# Patient Record
Sex: FEMALE | Race: WHITE | NOT HISPANIC OR LATINO | Employment: OTHER | URBAN - METROPOLITAN AREA
[De-identification: names, ages, dates, MRNs, and addresses within clinical notes are randomized per-mention and may not be internally consistent; named-entity substitution may affect disease eponyms.]

---

## 2017-03-27 DIAGNOSIS — E78.49 OTHER HYPERLIPIDEMIA: Primary | ICD-10-CM

## 2017-03-27 LAB
CHOLEST SERPL-MCNC: 224 MG/DL (ref 0–200)
CHOLEST/HDLC SERPL: 4.23 {RATIO}
HDLC SERPL-MCNC: 53 MG/DL (ref 40–60)
LDLC SERPL CALC-MCNC: 143 MG/DL (ref 0–100)
TRIGL SERPL-MCNC: 142 MG/DL (ref 0–150)
VLDLC SERPL CALC-MCNC: 28.4 MG/DL (ref 5–40)

## 2017-03-30 ENCOUNTER — OFFICE VISIT (OUTPATIENT)
Dept: INTERNAL MEDICINE | Facility: CLINIC | Age: 62
End: 2017-03-30

## 2017-03-30 VITALS
SYSTOLIC BLOOD PRESSURE: 152 MMHG | HEART RATE: 67 BPM | DIASTOLIC BLOOD PRESSURE: 82 MMHG | OXYGEN SATURATION: 93 % | BODY MASS INDEX: 27.88 KG/M2 | WEIGHT: 142 LBS | HEIGHT: 60 IN

## 2017-03-30 DIAGNOSIS — I10 ESSENTIAL HYPERTENSION: Primary | ICD-10-CM

## 2017-03-30 DIAGNOSIS — E78.5 HYPERLIPIDEMIA, UNSPECIFIED HYPERLIPIDEMIA TYPE: ICD-10-CM

## 2017-03-30 DIAGNOSIS — G93.31 POSTVIRAL FATIGUE SYNDROME: ICD-10-CM

## 2017-03-30 PROCEDURE — 99214 OFFICE O/P EST MOD 30 MIN: CPT | Performed by: NURSE PRACTITIONER

## 2017-03-30 RX ORDER — LISINOPRIL 10 MG/1
10 TABLET ORAL DAILY
Qty: 30 TABLET | Refills: 2 | Status: SHIPPED | OUTPATIENT
Start: 2017-03-30 | End: 2017-04-14 | Stop reason: DRUGHIGH

## 2017-03-30 RX ORDER — ROSUVASTATIN CALCIUM 5 MG/1
5 TABLET, COATED ORAL DAILY
Qty: 30 TABLET | Refills: 2 | Status: SHIPPED | OUTPATIENT
Start: 2017-03-30 | End: 2017-06-24 | Stop reason: SDUPTHER

## 2017-04-04 PROBLEM — G93.31 POSTVIRAL FATIGUE SYNDROME: Status: ACTIVE | Noted: 2017-04-04

## 2017-04-13 ENCOUNTER — TELEPHONE (OUTPATIENT)
Dept: INTERNAL MEDICINE | Facility: CLINIC | Age: 62
End: 2017-04-13

## 2017-04-13 NOTE — TELEPHONE ENCOUNTER
----- Message from Carina Rubalcava sent at 4/13/2017  9:16 AM EDT -----  Pt is on Lisinopril 10 mg. She is having cataract surgery on Monday. She said her BP has been running high. She wants a call back today but not between 10-12, she is at another appt.  Pt phone: [phone]    Ex: 165/98 and 160/90

## 2017-04-13 NOTE — TELEPHONE ENCOUNTER
Pt stated that her BP for the last 3 days has been running from 165//90. Pt is having cataract surgery on Monday & she is nervous that if they check her BP right before her surgery, they won't go forward with the surgery. Pt requesting to ask Irma what we need to do to get this down.

## 2017-04-13 NOTE — TELEPHONE ENCOUNTER
Pt aware to increase her lisinopril 10mg from QD to BID per Irma. Pt states that she will do this & update me on her BP readings. Pt was directed that if over the weekend she has bp readings of 170-180'S, to go to the ER.

## 2017-04-14 ENCOUNTER — TELEPHONE (OUTPATIENT)
Dept: INTERNAL MEDICINE | Facility: CLINIC | Age: 62
End: 2017-04-14

## 2017-04-14 RX ORDER — LISINOPRIL 20 MG/1
20 TABLET ORAL DAILY
Qty: 90 TABLET | Refills: 0 | Status: SHIPPED | OUTPATIENT
Start: 2017-04-14 | End: 2017-07-10 | Stop reason: SDUPTHER

## 2017-04-14 NOTE — TELEPHONE ENCOUNTER
----- Message from Carina Rubalcava sent at 4/14/2017  9:47 AM EDT -----  Pt called you back today about her BP readings?  Phone: [phone]

## 2017-04-14 NOTE — TELEPHONE ENCOUNTER
Pt stated that her BP is 140/80. Per Irma: pt to follow up in a month for increasing her BP med to 20mg. Pt was transferred to the front for scheduling.

## 2017-04-14 NOTE — TELEPHONE ENCOUNTER
Pt called & stated her BP readings have been 145/85. Pt stated that she will need a new RX for Lisinopril 20mg once daily sent to her pharmacy since her 10mg's are running low with her taking them BID. Pt aware that I'm sending those over now. Pt will call me around 4pm with a new BP reading.

## 2017-05-15 ENCOUNTER — OFFICE VISIT (OUTPATIENT)
Dept: INTERNAL MEDICINE | Facility: CLINIC | Age: 62
End: 2017-05-15

## 2017-05-15 VITALS
HEART RATE: 85 BPM | BODY MASS INDEX: 29.12 KG/M2 | HEIGHT: 60 IN | SYSTOLIC BLOOD PRESSURE: 118 MMHG | OXYGEN SATURATION: 99 % | DIASTOLIC BLOOD PRESSURE: 76 MMHG | WEIGHT: 148.31 LBS

## 2017-05-15 DIAGNOSIS — I10 ESSENTIAL HYPERTENSION: Primary | ICD-10-CM

## 2017-05-15 DIAGNOSIS — E78.5 HYPERLIPIDEMIA, UNSPECIFIED HYPERLIPIDEMIA TYPE: ICD-10-CM

## 2017-05-15 PROCEDURE — 99213 OFFICE O/P EST LOW 20 MIN: CPT | Performed by: NURSE PRACTITIONER

## 2017-06-24 DIAGNOSIS — E78.5 HYPERLIPIDEMIA, UNSPECIFIED HYPERLIPIDEMIA TYPE: ICD-10-CM

## 2017-06-26 RX ORDER — ROSUVASTATIN CALCIUM 5 MG/1
TABLET, COATED ORAL
Qty: 30 TABLET | Refills: 0 | Status: SHIPPED | OUTPATIENT
Start: 2017-06-26 | End: 2017-07-23 | Stop reason: SDUPTHER

## 2017-06-29 LAB
ALBUMIN SERPL-MCNC: 4.2 G/DL (ref 3.5–5.2)
ALBUMIN/GLOB SERPL: 1.4 G/DL
ALP SERPL-CCNC: 117 U/L (ref 39–117)
ALT SERPL-CCNC: 27 U/L (ref 1–33)
AST SERPL-CCNC: 27 U/L (ref 1–32)
BASOPHILS # BLD AUTO: 0.03 10*3/MM3 (ref 0–0.2)
BASOPHILS NFR BLD AUTO: 0.3 % (ref 0–1.5)
BILIRUB SERPL-MCNC: 0.3 MG/DL (ref 0.1–1.2)
BUN SERPL-MCNC: 15 MG/DL (ref 8–23)
BUN/CREAT SERPL: 26.3 (ref 7–25)
CALCIUM SERPL-MCNC: 10.2 MG/DL (ref 8.6–10.5)
CHLORIDE SERPL-SCNC: 99 MMOL/L (ref 98–107)
CHOLEST SERPL-MCNC: 174 MG/DL (ref 0–200)
CHOLEST/HDLC SERPL: 3.16 {RATIO}
CO2 SERPL-SCNC: 33.3 MMOL/L (ref 22–29)
CREAT SERPL-MCNC: 0.57 MG/DL (ref 0.57–1)
EOSINOPHIL # BLD AUTO: 0.29 10*3/MM3 (ref 0–0.7)
EOSINOPHIL NFR BLD AUTO: 3.3 % (ref 0.3–6.2)
ERYTHROCYTE [DISTWIDTH] IN BLOOD BY AUTOMATED COUNT: 14.6 % (ref 11.7–13)
GLOBULIN SER CALC-MCNC: 3.1 GM/DL
GLUCOSE SERPL-MCNC: 129 MG/DL (ref 65–99)
HCT VFR BLD AUTO: 49.1 % (ref 35.6–45.5)
HDLC SERPL-MCNC: 55 MG/DL (ref 40–60)
HGB BLD-MCNC: 15.8 G/DL (ref 11.9–15.5)
IMM GRANULOCYTES # BLD: 0 10*3/MM3 (ref 0–0.03)
IMM GRANULOCYTES NFR BLD: 0 % (ref 0–0.5)
LDLC SERPL CALC-MCNC: 56 MG/DL (ref 0–100)
LYMPHOCYTES # BLD AUTO: 2.12 10*3/MM3 (ref 0.9–4.8)
LYMPHOCYTES NFR BLD AUTO: 24 % (ref 19.6–45.3)
MAGNESIUM SERPL-MCNC: 2.3 MG/DL (ref 1.6–2.4)
MCH RBC QN AUTO: 32 PG (ref 26.9–32)
MCHC RBC AUTO-ENTMCNC: 32.2 G/DL (ref 32.4–36.3)
MCV RBC AUTO: 99.6 FL (ref 80.5–98.2)
MONOCYTES # BLD AUTO: 0.72 10*3/MM3 (ref 0.2–1.2)
MONOCYTES NFR BLD AUTO: 8.2 % (ref 5–12)
NEUTROPHILS # BLD AUTO: 5.66 10*3/MM3 (ref 1.9–8.1)
NEUTROPHILS NFR BLD AUTO: 64.2 % (ref 42.7–76)
PLATELET # BLD AUTO: 261 10*3/MM3 (ref 140–500)
POTASSIUM SERPL-SCNC: 4.8 MMOL/L (ref 3.5–5.2)
PROT SERPL-MCNC: 7.3 G/DL (ref 6–8.5)
RBC # BLD AUTO: 4.93 10*6/MM3 (ref 3.9–5.2)
SODIUM SERPL-SCNC: 144 MMOL/L (ref 136–145)
TRIGL SERPL-MCNC: 317 MG/DL (ref 0–150)
VLDLC SERPL CALC-MCNC: 63.4 MG/DL (ref 5–40)
WBC # BLD AUTO: 8.82 10*3/MM3 (ref 4.5–10.7)

## 2017-06-30 ENCOUNTER — RESULTS ENCOUNTER (OUTPATIENT)
Dept: INTERNAL MEDICINE | Facility: CLINIC | Age: 62
End: 2017-06-30

## 2017-06-30 DIAGNOSIS — E78.5 HYPERLIPIDEMIA, UNSPECIFIED HYPERLIPIDEMIA TYPE: ICD-10-CM

## 2017-06-30 DIAGNOSIS — I10 ESSENTIAL HYPERTENSION: ICD-10-CM

## 2017-06-30 LAB
HBA1C MFR BLD: 7 % (ref 4.8–5.6)
Lab: NORMAL
WRITTEN AUTHORIZATION: NORMAL

## 2017-07-06 ENCOUNTER — TELEPHONE (OUTPATIENT)
Dept: INTERNAL MEDICINE | Facility: CLINIC | Age: 62
End: 2017-07-06

## 2017-07-06 NOTE — TELEPHONE ENCOUNTER
----- Message from RUSSELL Clemente sent at 7/6/2017 12:38 PM EDT -----  Please let patient know that her blood sugar shows that she is diabetic. Please have her schedule a follow up appointment with me to discuss options

## 2017-07-10 ENCOUNTER — OFFICE VISIT (OUTPATIENT)
Dept: INTERNAL MEDICINE | Facility: CLINIC | Age: 62
End: 2017-07-10

## 2017-07-10 VITALS
WEIGHT: 151.25 LBS | HEART RATE: 91 BPM | DIASTOLIC BLOOD PRESSURE: 82 MMHG | SYSTOLIC BLOOD PRESSURE: 132 MMHG | OXYGEN SATURATION: 87 % | BODY MASS INDEX: 29.7 KG/M2 | HEIGHT: 60 IN

## 2017-07-10 DIAGNOSIS — E11.9 TYPE 2 DIABETES MELLITUS WITHOUT COMPLICATION, WITHOUT LONG-TERM CURRENT USE OF INSULIN (HCC): ICD-10-CM

## 2017-07-10 DIAGNOSIS — J44.9 CHRONIC OBSTRUCTIVE PULMONARY DISEASE, UNSPECIFIED COPD TYPE (HCC): Primary | ICD-10-CM

## 2017-07-10 PROCEDURE — 99214 OFFICE O/P EST MOD 30 MIN: CPT | Performed by: NURSE PRACTITIONER

## 2017-07-10 RX ORDER — LISINOPRIL 20 MG/1
TABLET ORAL
Qty: 90 TABLET | Refills: 1 | Status: SHIPPED | OUTPATIENT
Start: 2017-07-10 | End: 2018-01-23 | Stop reason: SDUPTHER

## 2017-07-10 RX ORDER — ALBUTEROL SULFATE 90 UG/1
2 AEROSOL, METERED RESPIRATORY (INHALATION) EVERY 4 HOURS PRN
Qty: 18 G | Refills: 3 | Status: SHIPPED | OUTPATIENT
Start: 2017-07-10

## 2017-07-10 NOTE — PROGRESS NOTES
Subjective   Laine Thomas is a 62 y.o. female. Patient is here today for   Chief Complaint   Patient presents with   • Diabetes     Pt here to follow up on lab results.    .    History of Present Illness   Patient is here to follow up on labs.     C/o increased shortness of breath with the high humidity the past couple of weeks. She states that she noticed a change with her breathing since she had flu a few months ago. She is   going to [place] in the next couple of weeks to visit her daughter. She has seen Dr. Maude Osuna in the past and will call her to make a follow up appointment. She is requesting an inhaler in the meantime.  She has used Advair in the past and does not feel like it helps very much.  She has not really used it on a regular basis due to the cost.     The following portions of the patient's history were reviewed and updated as appropriate: allergies, current medications, past family history, past medical history, past social history, past surgical history and problem list.    Review of Systems   Constitutional: Negative.    Respiratory: Positive for shortness of breath. Negative for cough and wheezing.    Cardiovascular: Negative.    Gastrointestinal: Negative.    Endocrine: Negative.    Genitourinary: Negative.        Objective     Vitals:    07/10/17 1000   BP: 132/82   Pulse: 91   SpO2: (!) 87%       Results Encounter on 06/30/2017   Component Date Value Ref Range Status   • WBC 06/29/2017 8.82  4.50 - 10.70 10*3/mm3 Final   • RBC 06/29/2017 4.93  3.90 - 5.20 10*6/mm3 Final   • Hemoglobin 06/29/2017 15.8* 11.9 - 15.5 g/dL Final   • Hematocrit 06/29/2017 49.1* 35.6 - 45.5 % Final   • MCV 06/29/2017 99.6* 80.5 - 98.2 fL Final   • MCH 06/29/2017 32.0  26.9 - 32.0 pg Final   • MCHC 06/29/2017 32.2* 32.4 - 36.3 g/dL Final   • RDW 06/29/2017 14.6* 11.7 - 13.0 % Final   • Platelets 06/29/2017 261  140 - 500 10*3/mm3 Final   • Neutrophil Rel % 06/29/2017 64.2  42.7 - 76.0 % Final   • Lymphocyte Rel  % 06/29/2017 24.0  19.6 - 45.3 % Final   • Monocyte Rel % 06/29/2017 8.2  5.0 - 12.0 % Final   • Eosinophil Rel % 06/29/2017 3.3  0.3 - 6.2 % Final   • Basophil Rel % 06/29/2017 0.3  0.0 - 1.5 % Final   • Neutrophils Absolute 06/29/2017 5.66  1.90 - 8.10 10*3/mm3 Final   • Lymphocytes Absolute 06/29/2017 2.12  0.90 - 4.80 10*3/mm3 Final   • Monocytes Absolute 06/29/2017 0.72  0.20 - 1.20 10*3/mm3 Final   • Eosinophils Absolute 06/29/2017 0.29  0.00 - 0.70 10*3/mm3 Final   • Basophils Absolute 06/29/2017 0.03  0.00 - 0.20 10*3/mm3 Final   • Immature Granulocyte Rel % 06/29/2017 0.0  0.0 - 0.5 % Final   • Immature Grans Absolute 06/29/2017 0.00  0.00 - 0.03 10*3/mm3 Final   • Glucose 06/29/2017 129* 65 - 99 mg/dL Final   • BUN 06/29/2017 15  8 - 23 mg/dL Final   • Creatinine 06/29/2017 0.57  0.57 - 1.00 mg/dL Final   • eGFR Non African Am 06/29/2017 107  >60 mL/min/1.73 Final   • eGFR African Am 06/29/2017 130  >60 mL/min/1.73 Final   • BUN/Creatinine Ratio 06/29/2017 26.3* 7.0 - 25.0 Final   • Sodium 06/29/2017 144  136 - 145 mmol/L Final   • Potassium 06/29/2017 4.8  3.5 - 5.2 mmol/L Final   • Chloride 06/29/2017 99  98 - 107 mmol/L Final   • Total CO2 06/29/2017 33.3* 22.0 - 29.0 mmol/L Final   • Calcium 06/29/2017 10.2  8.6 - 10.5 mg/dL Final   • Total Protein 06/29/2017 7.3  6.0 - 8.5 g/dL Final   • Albumin 06/29/2017 4.20  3.50 - 5.20 g/dL Final   • Globulin 06/29/2017 3.1  gm/dL Final   • A/G Ratio 06/29/2017 1.4  g/dL Final   • Total Bilirubin 06/29/2017 0.3  0.1 - 1.2 mg/dL Final   • Alkaline Phosphatase 06/29/2017 117  39 - 117 U/L Final   • AST (SGOT) 06/29/2017 27  1 - 32 U/L Final   • ALT (SGPT) 06/29/2017 27  1 - 33 U/L Final   • Magnesium 06/29/2017 2.3  1.6 - 2.4 mg/dL Final   • Total Cholesterol 06/29/2017 174  0 - 200 mg/dL Final   • Triglycerides 06/29/2017 317* 0 - 150 mg/dL Final   • HDL Cholesterol 06/29/2017 55  40 - 60 mg/dL Final   • VLDL Cholesterol 06/29/2017 63.4* 5 - 40 mg/dL Final   • LDL  Cholesterol  06/29/2017 56  0 - 100 mg/dL Final   • Chol/HDL Ratio 06/29/2017 3.16   Final   • Hemoglobin A1C 06/29/2017 7.00* 4.80 - 5.60 % Final    Comment: Hemoglobin A1C Ranges:  Increased Risk for Diabetes  5.7% to 6.4%  Diabetes                     >= 6.5%  Diabetic Goal                < 7.0%     • Please note 06/29/2017 Comment   Final    Comment: We have received your request for additional testing or test  verification.  You will be notified if we are unable to process  your request.     • Written Authorization 06/29/2017 Comment   Final    Comment: Written Authorization Received.  Authorization received from WRITTEN REQUEST 06-  Logged by Khloe Macario       Labs were reviewed with patient. She has been diagnosed with Type 2 diabetes.     Physical Exam   Constitutional: Vital signs are normal. She appears well-developed and well-nourished.   Cardiovascular: Normal rate, regular rhythm and normal heart sounds.    Pulmonary/Chest: Effort normal. No respiratory distress. She has decreased breath sounds in the right lower field and the left lower field.   Neurological: She is alert.   Skin: Skin is warm, dry and intact.   Psychiatric: She has a normal mood and affect. Her speech is normal and behavior is normal. Thought content normal.   Nursing note and vitals reviewed.      Assessment/Plan   Laine was seen today for diabetes.    Diagnoses and all orders for this visit:    Chronic obstructive pulmonary disease, unspecified COPD type  -     albuterol (PROVENTIL HFA;VENTOLIN HFA) 108 (90 BASE) MCG/ACT inhaler; Inhale 2 puffs Every 4 (Four) Hours As Needed for Wheezing.    Type 2 diabetes mellitus without complication, without long-term current use of insulin  -     Comprehensive Metabolic Panel; Future  -     Hemoglobin A1c; Future    Patient would like to try to improve her blood sugar with diet and exercise.  She will follow up in 3 months with labs and appointment.  Her  is also diabetic.   She is not wanting to go to diabetic education at this time.        COPD - she was given samples of both Anoro and Symbicort. She will try one for 2 weeks and then try the other. She was given enough samples for a few weeks since she will be traveling next week. She will call and make an appointment with pulmonary.

## 2017-07-12 ENCOUNTER — TELEPHONE (OUTPATIENT)
Dept: INTERNAL MEDICINE | Facility: CLINIC | Age: 62
End: 2017-07-12

## 2017-07-12 DIAGNOSIS — J44.9 CHRONIC OBSTRUCTIVE PULMONARY DISEASE, UNSPECIFIED COPD TYPE (HCC): Primary | ICD-10-CM

## 2017-07-12 NOTE — TELEPHONE ENCOUNTER
----- Message from Carina Rubalcava sent at 7/12/2017  9:59 AM EDT -----  Pt called eliing Irma's assistant to call her back. All that she would say is that is concerning a referral. Would not elaborate.  [facility]: [phone]

## 2017-07-12 NOTE — TELEPHONE ENCOUNTER
Pt called & stated that she saw Irma for COPD on Monday. Her insurance does not require a referral for specialist, but the pulmonary office that she goes to, does require one. This has been okay'd be Irma.

## 2017-07-23 DIAGNOSIS — E78.5 HYPERLIPIDEMIA, UNSPECIFIED HYPERLIPIDEMIA TYPE: ICD-10-CM

## 2017-07-24 RX ORDER — ROSUVASTATIN CALCIUM 5 MG/1
TABLET, COATED ORAL
Qty: 30 TABLET | Refills: 5 | Status: SHIPPED | OUTPATIENT
Start: 2017-07-24 | End: 2018-01-23 | Stop reason: SDUPTHER

## 2017-10-06 DIAGNOSIS — E11.9 TYPE 2 DIABETES MELLITUS WITHOUT COMPLICATION, WITHOUT LONG-TERM CURRENT USE OF INSULIN (HCC): ICD-10-CM

## 2017-10-17 ENCOUNTER — OFFICE VISIT (OUTPATIENT)
Dept: INTERNAL MEDICINE | Facility: CLINIC | Age: 62
End: 2017-10-17

## 2017-10-17 VITALS
HEART RATE: 77 BPM | OXYGEN SATURATION: 97 % | SYSTOLIC BLOOD PRESSURE: 120 MMHG | DIASTOLIC BLOOD PRESSURE: 60 MMHG | HEIGHT: 60 IN | BODY MASS INDEX: 31.72 KG/M2 | WEIGHT: 161.56 LBS

## 2017-10-17 DIAGNOSIS — E11.9 TYPE 2 DIABETES MELLITUS WITHOUT COMPLICATION, WITHOUT LONG-TERM CURRENT USE OF INSULIN (HCC): ICD-10-CM

## 2017-10-17 DIAGNOSIS — I48.91 ATRIAL FIBRILLATION, UNSPECIFIED TYPE (HCC): ICD-10-CM

## 2017-10-17 DIAGNOSIS — J44.9 CHRONIC OBSTRUCTIVE PULMONARY DISEASE, UNSPECIFIED COPD TYPE (HCC): Primary | ICD-10-CM

## 2017-10-17 PROCEDURE — 99214 OFFICE O/P EST MOD 30 MIN: CPT | Performed by: NURSE PRACTITIONER

## 2017-10-17 RX ORDER — DILTIAZEM HYDROCHLORIDE 120 MG/1
2 CAPSULE, EXTENDED RELEASE ORAL DAILY
COMMUNITY
Start: 2017-10-10 | End: 2018-09-11

## 2017-10-17 RX ORDER — RIVAROXABAN 20 MG/1
1 TABLET, FILM COATED ORAL DAILY
COMMUNITY
Start: 2017-10-12 | End: 2018-09-11

## 2017-10-17 RX ORDER — GLYCOPYRROLATE AND FORMOTEROL FUMARATE 9; 4.8 UG/1; UG/1
2 AEROSOL, METERED RESPIRATORY (INHALATION) 2 TIMES DAILY
COMMUNITY
Start: 2017-09-19 | End: 2020-06-05

## 2017-10-17 NOTE — PROGRESS NOTES
Subjective   Laine Thomas is a 62 y.o. female. Patient is here today for   Chief Complaint   Patient presents with   • Diabetes     Pt here to follow up on dm. Pt did not have labs done prior to appt but she is fasting this morning.    • Atrial Fibrillation     Pt recently dx with A Fib & is scheduled to follow up with Cardiology next week. Pt is now taking Xarelto 20mg once daily & Cartia 120mg once daily.     .    History of Present Illness   Patient is here to f/u after an ER visit. She has seen pulmonology and was started on Bevespi. States that not a lot has changed with her breathing. She still has some shortness of breath with exertion. She had a sleep study and will be fitted with a CPAP soon. She will also need O2 at night. She is currently in pulmonary rehab and was found to be in atrial fib, so was sent to ER. There she was started on xarelto and diltiazem. She has a f/u appointment with cardiologist next week. States that she could not tell that she was in afib (didn't have palpitations). She is also waiting for an event monitor to wear. While she was in the hospital, she had fasting labs done.     Patient is also here to follow up on diabetes. Her last HgbA1C was 7.00 and she did not want to go on medication. She was going to change her diet. She admits that she hasn't been able to do that due to her other health issues, but states that she is ready to make changes.      The following portions of the patient's history were reviewed and updated as appropriate: allergies, current medications, past family history, past medical history, past social history, past surgical history and problem list.    Review of Systems   Constitutional: Negative.    HENT: Negative.    Respiratory: Positive for shortness of breath. Negative for cough and wheezing.    Cardiovascular: Negative.    Gastrointestinal: Negative.    Endocrine: Negative.    Genitourinary: Negative.        Objective   Vitals:    10/17/17 0807   BP:  120/60   Pulse: 77   SpO2: 97%     Reviewed labs that patient had drawn at [facility], including CMP, lipids, troponin, and TSH. These will be scanned into the chart.     Physical Exam   Constitutional: Vital signs are normal. She appears well-developed and well-nourished.   HENT:   Right Ear: Tympanic membrane and ear canal normal.   Left Ear: Tympanic membrane and ear canal normal.   Mouth/Throat: Oropharynx is clear and moist and mucous membranes are normal.   Cardiovascular: Normal rate, regular rhythm and normal heart sounds.    Heart rhythm is regular at this time   Pulmonary/Chest: Effort normal and breath sounds normal.   Lymphadenopathy:     She has no cervical adenopathy.   Skin: Skin is warm, dry and intact.   Psychiatric: She has a normal mood and affect. Her speech is normal and behavior is normal. Thought content normal.   Nursing note and vitals reviewed.      Assessment/Plan   Laine was seen today for diabetes and atrial fibrillation.    Diagnoses and all orders for this visit:    Chronic obstructive pulmonary disease, unspecified COPD type    Type 2 diabetes mellitus without complication, without long-term current use of insulin    Atrial fibrillation, unspecified type      COPD - followed by pulmonology    DM - Patient is not wanting a HgbA1C checked today due to finances/high deductible plan. She will return after the first of the year for a complete physical and fasting labs. She is going to change her eating habits to improve her glucose.     Atrial fib - follow up with cardiology next week

## 2017-10-18 PROBLEM — I48.91 ATRIAL FIBRILLATION (HCC): Status: ACTIVE | Noted: 2017-10-18

## 2017-12-06 ENCOUNTER — AMBULATORY SURGICAL CENTER (OUTPATIENT)
Dept: URBAN - METROPOLITAN AREA SURGERY 17 | Facility: SURGERY | Age: 62
End: 2017-12-06

## 2017-12-06 VITALS
SYSTOLIC BLOOD PRESSURE: 112 MMHG | HEART RATE: 87 BPM | SYSTOLIC BLOOD PRESSURE: 140 MMHG | OXYGEN SATURATION: 92 % | OXYGEN SATURATION: 81 % | HEART RATE: 85 BPM | SYSTOLIC BLOOD PRESSURE: 135 MMHG | OXYGEN SATURATION: 93 % | RESPIRATION RATE: 14 BRPM | HEART RATE: 73 BPM | HEART RATE: 102 BPM | DIASTOLIC BLOOD PRESSURE: 65 MMHG | SYSTOLIC BLOOD PRESSURE: 139 MMHG | RESPIRATION RATE: 19 BRPM | WEIGHT: 165 LBS | DIASTOLIC BLOOD PRESSURE: 77 MMHG | OXYGEN SATURATION: 87 % | OXYGEN SATURATION: 91 % | SYSTOLIC BLOOD PRESSURE: 113 MMHG | HEART RATE: 92 BPM | SYSTOLIC BLOOD PRESSURE: 126 MMHG | HEART RATE: 83 BPM | RESPIRATION RATE: 35 BRPM | HEART RATE: 78 BPM | SYSTOLIC BLOOD PRESSURE: 111 MMHG | HEART RATE: 81 BPM | TEMPERATURE: 99.2 F | DIASTOLIC BLOOD PRESSURE: 61 MMHG | OXYGEN SATURATION: 94 % | DIASTOLIC BLOOD PRESSURE: 63 MMHG | SYSTOLIC BLOOD PRESSURE: 128 MMHG | DIASTOLIC BLOOD PRESSURE: 82 MMHG | RESPIRATION RATE: 16 BRPM | DIASTOLIC BLOOD PRESSURE: 73 MMHG | OXYGEN SATURATION: 96 % | DIASTOLIC BLOOD PRESSURE: 68 MMHG | TEMPERATURE: 98.9 F | HEART RATE: 99 BPM | RESPIRATION RATE: 17 BRPM | RESPIRATION RATE: 12 BRPM | HEART RATE: 74 BPM | DIASTOLIC BLOOD PRESSURE: 60 MMHG | DIASTOLIC BLOOD PRESSURE: 67 MMHG | HEART RATE: 67 BPM

## 2017-12-06 DIAGNOSIS — Z86.010 PERSONAL HISTORY OF COLONIC POLYPS: ICD-10-CM

## 2017-12-06 DIAGNOSIS — K57.30 DIVERTICULOSIS OF LARGE INTESTINE WITHOUT PERFORATION OR ABS: ICD-10-CM

## 2017-12-06 DIAGNOSIS — K64.9 UNSPECIFIED HEMORRHOIDS: ICD-10-CM

## 2017-12-06 PROCEDURE — 45378 DIAGNOSTIC COLONOSCOPY: CPT | Mod: 33 | Performed by: INTERNAL MEDICINE

## 2017-12-06 RX ADMIN — PROPOFOL 25 MG: 10 INJECTION, EMULSION INTRAVENOUS at 13:38

## 2017-12-06 RX ADMIN — PROPOFOL 25 MG: 10 INJECTION, EMULSION INTRAVENOUS at 13:44

## 2017-12-06 RX ADMIN — PROPOFOL 50 MG: 10 INJECTION, EMULSION INTRAVENOUS at 13:34

## 2017-12-06 RX ADMIN — LIDOCAINE HYDROCHLORIDE 50 MG: 10 INJECTION, SOLUTION EPIDURAL; INFILTRATION; INTRACAUDAL; PERINEURAL at 13:29

## 2017-12-06 RX ADMIN — PROPOFOL 100 MG: 10 INJECTION, EMULSION INTRAVENOUS at 13:29

## 2017-12-07 PROBLEM — K57.30 DIVERTICULOSIS OF LARGE INTESTINE WITHOUT HEMORRHAGE: Status: ACTIVE | Noted: 2017-12-07

## 2018-01-23 DIAGNOSIS — E78.5 HYPERLIPIDEMIA, UNSPECIFIED HYPERLIPIDEMIA TYPE: ICD-10-CM

## 2018-01-23 RX ORDER — ROSUVASTATIN CALCIUM 5 MG/1
TABLET, COATED ORAL
Qty: 30 TABLET | Refills: 5 | Status: SHIPPED | OUTPATIENT
Start: 2018-01-23 | End: 2018-07-31 | Stop reason: SDUPTHER

## 2018-01-23 RX ORDER — LISINOPRIL 20 MG/1
TABLET ORAL
Qty: 30 TABLET | Refills: 5 | Status: SHIPPED | OUTPATIENT
Start: 2018-01-23 | End: 2018-02-13

## 2018-02-08 DIAGNOSIS — R73.09 ELEVATED HEMOGLOBIN A1C: ICD-10-CM

## 2018-02-08 DIAGNOSIS — Z00.00 HEALTH CARE MAINTENANCE: Primary | ICD-10-CM

## 2018-02-08 DIAGNOSIS — E78.49 OTHER HYPERLIPIDEMIA: ICD-10-CM

## 2018-02-09 LAB
ALBUMIN SERPL-MCNC: 3.7 G/DL (ref 3.5–5.2)
ALBUMIN/GLOB SERPL: 1.2 G/DL
ALP SERPL-CCNC: 111 U/L (ref 39–117)
ALT SERPL-CCNC: 28 U/L (ref 1–33)
AST SERPL-CCNC: 19 U/L (ref 1–32)
BASOPHILS # BLD AUTO: 0.03 10*3/MM3 (ref 0–0.2)
BASOPHILS NFR BLD AUTO: 0.3 % (ref 0–1.5)
BILIRUB SERPL-MCNC: 0.5 MG/DL (ref 0.1–1.2)
BUN SERPL-MCNC: 15 MG/DL (ref 8–23)
BUN/CREAT SERPL: 19.2 (ref 7–25)
CALCIUM SERPL-MCNC: 9.5 MG/DL (ref 8.6–10.5)
CHLORIDE SERPL-SCNC: 98 MMOL/L (ref 98–107)
CHOLEST SERPL-MCNC: 158 MG/DL (ref 0–200)
CHOLEST/HDLC SERPL: 3.29 {RATIO}
CO2 SERPL-SCNC: 28.5 MMOL/L (ref 22–29)
CREAT SERPL-MCNC: 0.78 MG/DL (ref 0.57–1)
EOSINOPHIL # BLD AUTO: 0.2 10*3/MM3 (ref 0–0.7)
EOSINOPHIL NFR BLD AUTO: 1.9 % (ref 0.3–6.2)
ERYTHROCYTE [DISTWIDTH] IN BLOOD BY AUTOMATED COUNT: 12.7 % (ref 11.7–13)
GFR SERPLBLD CREATININE-BSD FMLA CKD-EPI: 75 ML/MIN/1.73
GFR SERPLBLD CREATININE-BSD FMLA CKD-EPI: 90 ML/MIN/1.73
GLOBULIN SER CALC-MCNC: 3.1 GM/DL
GLUCOSE SERPL-MCNC: 196 MG/DL (ref 65–99)
HBA1C MFR BLD: 8.83 % (ref 4.8–5.6)
HCT VFR BLD AUTO: 46 % (ref 35.6–45.5)
HDLC SERPL-MCNC: 48 MG/DL (ref 40–60)
HGB BLD-MCNC: 14.8 G/DL (ref 11.9–15.5)
IMM GRANULOCYTES # BLD: 0.02 10*3/MM3 (ref 0–0.03)
IMM GRANULOCYTES NFR BLD: 0.2 % (ref 0–0.5)
LDLC SERPL CALC-MCNC: 81 MG/DL (ref 0–100)
LYMPHOCYTES # BLD AUTO: 2.04 10*3/MM3 (ref 0.9–4.8)
LYMPHOCYTES NFR BLD AUTO: 19.4 % (ref 19.6–45.3)
MCH RBC QN AUTO: 31.7 PG (ref 26.9–32)
MCHC RBC AUTO-ENTMCNC: 32.2 G/DL (ref 32.4–36.3)
MCV RBC AUTO: 98.5 FL (ref 80.5–98.2)
MONOCYTES # BLD AUTO: 0.69 10*3/MM3 (ref 0.2–1.2)
MONOCYTES NFR BLD AUTO: 6.6 % (ref 5–12)
NEUTROPHILS # BLD AUTO: 7.55 10*3/MM3 (ref 1.9–8.1)
NEUTROPHILS NFR BLD AUTO: 71.6 % (ref 42.7–76)
PLATELET # BLD AUTO: 293 10*3/MM3 (ref 140–500)
POTASSIUM SERPL-SCNC: 4.5 MMOL/L (ref 3.5–5.2)
PROT SERPL-MCNC: 6.8 G/DL (ref 6–8.5)
RBC # BLD AUTO: 4.67 10*6/MM3 (ref 3.9–5.2)
SODIUM SERPL-SCNC: 140 MMOL/L (ref 136–145)
TRIGL SERPL-MCNC: 145 MG/DL (ref 0–150)
VLDLC SERPL CALC-MCNC: 29 MG/DL (ref 5–40)
WBC # BLD AUTO: 10.53 10*3/MM3 (ref 4.5–10.7)

## 2018-02-13 ENCOUNTER — OFFICE VISIT (OUTPATIENT)
Dept: INTERNAL MEDICINE | Facility: CLINIC | Age: 63
End: 2018-02-13

## 2018-02-13 VITALS
SYSTOLIC BLOOD PRESSURE: 106 MMHG | BODY MASS INDEX: 32.85 KG/M2 | DIASTOLIC BLOOD PRESSURE: 60 MMHG | HEIGHT: 60 IN | OXYGEN SATURATION: 97 % | HEART RATE: 74 BPM | WEIGHT: 167.31 LBS

## 2018-02-13 DIAGNOSIS — I10 ESSENTIAL HYPERTENSION: ICD-10-CM

## 2018-02-13 DIAGNOSIS — E11.9 TYPE 2 DIABETES MELLITUS WITHOUT COMPLICATION, WITHOUT LONG-TERM CURRENT USE OF INSULIN (HCC): Primary | ICD-10-CM

## 2018-02-13 PROCEDURE — 99396 PREV VISIT EST AGE 40-64: CPT | Performed by: NURSE PRACTITIONER

## 2018-02-13 RX ORDER — BLOOD-GLUCOSE METER
1 KIT MISCELLANEOUS DAILY
Qty: 1 EACH | Refills: 0 | Status: SHIPPED | OUTPATIENT
Start: 2018-02-13

## 2018-02-13 RX ORDER — LISINOPRIL 5 MG/1
1 TABLET ORAL DAILY
COMMUNITY
Start: 2018-02-11 | End: 2018-03-01

## 2018-02-13 NOTE — PROGRESS NOTES
"Subjective   Laine Thomas is a 62 y.o. female and is here for a comprehensive physical exam. The patient reports no problems.    She was without her Cartia, so hasn't been able to exercise because she was afraid that her heart rate would get too high. She has gained weight since she wasn't able to exercise. She now is taking her Cartia and [facility] and will be starting pulmonary rehab to start at the end of the month.     Do you take any herbs or supplements that were not prescribed by a doctor? no  Are you taking aspirin daily? no     History:  Patient sees gynecology and is up to date with pap and mammogram    The following portions of the patient's history were reviewed and updated as appropriate: allergies, current medications, past family history, past medical history, past social history, past surgical history and problem list.    Review of Systems  Do you have pain that bothers you in your daily life? no  A comprehensive review of systems was negative.    Objective   /60 (BP Location: Left arm, Patient Position: Sitting, Cuff Size: Adult)  Pulse 74  Ht 152.4 cm (60\")  Wt 75.9 kg (167 lb 5 oz)  SpO2 97%  BMI 32.68 kg/m2    General Appearance:    Alert, cooperative, no distress, appears stated age   Head:    Normocephalic, without obvious abnormality, atraumatic   Eyes:    PERRL, conjunctiva/corneas clear, EOM's intact, fundi     benign, both eyes   Ears:    Normal TM's and external ear canals, both ears   Nose:   Nares normal, septum midline, mucosa normal, no drainage    or sinus tenderness   Throat:   Lips, mucosa, and tongue normal; teeth and gums normal   Neck:   Supple, symmetrical, trachea midline, no adenopathy;     thyroid:  no enlargement/tenderness/nodules; no carotid    bruit or JVD   Back:     Symmetric, no curvature, ROM normal, no CVA tenderness   Lungs:     Clear to auscultation bilaterally, respirations unlabored   Chest Wall:    No tenderness or deformity    Heart:    " Regular rate and rhythm, S1 and S2 normal, no murmur, rub   or gallop       Abdomen:     Soft, non-tender, bowel sounds active all four quadrants,     no masses, no organomegaly           Extremities:   Extremities normal, atraumatic, no cyanosis or edema   Pulses:   2+ and symmetric all extremities   Skin:   Skin color, texture, turgor normal, no rashes or lesions   Lymph nodes:   Cervical, supraclavicular, and axillary nodes normal   Neurologic:   CNII-XII intact, normal strength, sensation and reflexes     throughout     Orders Only on 02/08/2018   Component Date Value Ref Range Status   • WBC 02/08/2018 10.53  4.50 - 10.70 10*3/mm3 Final   • RBC 02/08/2018 4.67  3.90 - 5.20 10*6/mm3 Final   • Hemoglobin 02/08/2018 14.8  11.9 - 15.5 g/dL Final   • Hematocrit 02/08/2018 46.0* 35.6 - 45.5 % Final   • MCV 02/08/2018 98.5* 80.5 - 98.2 fL Final   • MCH 02/08/2018 31.7  26.9 - 32.0 pg Final   • MCHC 02/08/2018 32.2* 32.4 - 36.3 g/dL Final   • RDW 02/08/2018 12.7  11.7 - 13.0 % Final   • Platelets 02/08/2018 293  140 - 500 10*3/mm3 Final   • Neutrophil Rel % 02/08/2018 71.6  42.7 - 76.0 % Final   • Lymphocyte Rel % 02/08/2018 19.4* 19.6 - 45.3 % Final   • Monocyte Rel % 02/08/2018 6.6  5.0 - 12.0 % Final   • Eosinophil Rel % 02/08/2018 1.9  0.3 - 6.2 % Final   • Basophil Rel % 02/08/2018 0.3  0.0 - 1.5 % Final   • Neutrophils Absolute 02/08/2018 7.55  1.90 - 8.10 10*3/mm3 Final   • Lymphocytes Absolute 02/08/2018 2.04  0.90 - 4.80 10*3/mm3 Final   • Monocytes Absolute 02/08/2018 0.69  0.20 - 1.20 10*3/mm3 Final   • Eosinophils Absolute 02/08/2018 0.20  0.00 - 0.70 10*3/mm3 Final   • Basophils Absolute 02/08/2018 0.03  0.00 - 0.20 10*3/mm3 Final   • Immature Granulocyte Rel % 02/08/2018 0.2  0.0 - 0.5 % Final   • Immature Grans Absolute 02/08/2018 0.02  0.00 - 0.03 10*3/mm3 Final   • Glucose 02/08/2018 196* 65 - 99 mg/dL Final   • BUN 02/08/2018 15  8 - 23 mg/dL Final   • Creatinine 02/08/2018 0.78  0.57 - 1.00 mg/dL  Final   • eGFR Non  Am 02/08/2018 75  >60 mL/min/1.73 Final   • eGFR African Am 02/08/2018 90  >60 mL/min/1.73 Final   • BUN/Creatinine Ratio 02/08/2018 19.2  7.0 - 25.0 Final   • Sodium 02/08/2018 140  136 - 145 mmol/L Final   • Potassium 02/08/2018 4.5  3.5 - 5.2 mmol/L Final   • Chloride 02/08/2018 98  98 - 107 mmol/L Final   • Total CO2 02/08/2018 28.5  22.0 - 29.0 mmol/L Final   • Calcium 02/08/2018 9.5  8.6 - 10.5 mg/dL Final   • Total Protein 02/08/2018 6.8  6.0 - 8.5 g/dL Final   • Albumin 02/08/2018 3.70  3.50 - 5.20 g/dL Final   • Globulin 02/08/2018 3.1  gm/dL Final   • A/G Ratio 02/08/2018 1.2  g/dL Final   • Total Bilirubin 02/08/2018 0.5  0.1 - 1.2 mg/dL Final   • Alkaline Phosphatase 02/08/2018 111  39 - 117 U/L Final   • AST (SGOT) 02/08/2018 19  1 - 32 U/L Final   • ALT (SGPT) 02/08/2018 28  1 - 33 U/L Final   • Total Cholesterol 02/08/2018 158  0 - 200 mg/dL Final   • Triglycerides 02/08/2018 145  0 - 150 mg/dL Final   • HDL Cholesterol 02/08/2018 48  40 - 60 mg/dL Final   • VLDL Cholesterol 02/08/2018 29  5 - 40 mg/dL Final   • LDL Cholesterol  02/08/2018 81  0 - 100 mg/dL Final   • Chol/HDL Ratio 02/08/2018 3.29   Final   • Hemoglobin A1C 02/08/2018 8.83* 4.80 - 5.60 % Final    Comment: Hemoglobin A1C Ranges:  Increased Risk for Diabetes  5.7% to 6.4%  Diabetes                     >= 6.5%  Diabetic Goal                < 7.0%       Labs reviewed with patient.      Physical Exam    Laine had a diabetic foot exam performed today.   During the foot exam she had a monofilament test not performed.    Vascular Status -  Her exam exhibits right foot vasculature normal. Her exam exhibits no right foot edema. Her exam exhibits left foot vasculature normal. Her exam exhibits no left foot edema.   Skin Integrity  -  Her right foot skin is intact.     Laine 's left foot skin is intact. .      Assessment/Plan   Healthy female exam.     1. Laine was seen today for annual exam.    Diagnoses and all orders  for this visit:    Type 2 diabetes mellitus without complication, without long-term current use of insulin  -     Liraglutide (VICTOZA) 18 MG/3ML solution pen-injector injection; Inject 1.8 mg under the skin Daily.  -     Insulin Pen Needle (NOVOFINE PLUS) 32G X 4 MM misc; Use daily to inject victoza    Essential hypertension    Other orders  -     glucose monitor monitoring kit; 1 each Daily. Use to check glucose daily      DM - patient was given a sample of Victoza. She will also be exercising and improving her diet. She was unable to tolerate metformin in the past and really isn't wanting to take any medications. Patient will check her fasting glucose a few times a week and check a post-prandial glucose a few times a week.    2. Patient Counseling:  --Nutrition: Stressed importance of moderation in sodium/caffeine intake, saturated fat and cholesterol, caloric balance, sufficient intake of fresh fruits, vegetables, fiber, calcium, iron.  --Exercise: Stressed the importance of regular exercise.   --Injury prevention: Discussed safety belts, safety helmets, smoke detector.   --Dental health: Discussed importance of regular tooth brushing, flossing, and dental visits.  --Immunizations reviewed.  --Discussed benefits of screening colonoscopy. Done 12/2017    3. Discussed the patient's BMI with her.  The BMI is above average; BMI management plan is completed  4. Follow up in 2 months.

## 2018-02-14 LAB
HCV AB S/CO SERPL IA: <0.1 S/CO RATIO (ref 0–0.9)
Lab: NORMAL
WRITTEN AUTHORIZATION: NORMAL

## 2018-03-01 ENCOUNTER — OFFICE VISIT (OUTPATIENT)
Dept: INTERNAL MEDICINE | Facility: CLINIC | Age: 63
End: 2018-03-01

## 2018-03-01 VITALS
DIASTOLIC BLOOD PRESSURE: 64 MMHG | BODY MASS INDEX: 32.83 KG/M2 | HEIGHT: 60 IN | OXYGEN SATURATION: 90 % | SYSTOLIC BLOOD PRESSURE: 102 MMHG | HEART RATE: 84 BPM | WEIGHT: 167.25 LBS | TEMPERATURE: 99 F

## 2018-03-01 DIAGNOSIS — J06.9 ACUTE URI: Primary | ICD-10-CM

## 2018-03-01 LAB
EXPIRATION DATE: NORMAL
EXPIRATION DATE: NORMAL
FLUAV AG NPH QL: NEGATIVE
FLUBV AG NPH QL: NEGATIVE
INTERNAL CONTROL: NORMAL
INTERNAL CONTROL: NORMAL
Lab: NORMAL
Lab: NORMAL
S PYO AG THROAT QL: NEGATIVE

## 2018-03-01 PROCEDURE — 99213 OFFICE O/P EST LOW 20 MIN: CPT | Performed by: NURSE PRACTITIONER

## 2018-03-01 PROCEDURE — 87804 INFLUENZA ASSAY W/OPTIC: CPT | Performed by: NURSE PRACTITIONER

## 2018-03-01 PROCEDURE — 87880 STREP A ASSAY W/OPTIC: CPT | Performed by: NURSE PRACTITIONER

## 2018-03-01 RX ORDER — AMOXICILLIN 875 MG/1
875 TABLET, COATED ORAL 2 TIMES DAILY
Qty: 20 TABLET | Refills: 0 | Status: SHIPPED | OUTPATIENT
Start: 2018-03-01 | End: 2018-09-11

## 2018-03-01 NOTE — PROGRESS NOTES
Subjective   Laine Thomas is a 63 y.o. female. Patient is here today for   Chief Complaint   Patient presents with   • URI     Pt complains of having a productive cough, yellow sputum & ST, fatigue x5 days.     .    History of Present Illness   C/o sore throat associated with post-nasal drainage, headache, fatigue. Denies fever. She has had some runny nose. She has had some ibuprofen for her headache. Denies sick contacts. Her symptoms seem to be worse today.     The following portions of the patient's history were reviewed and updated as appropriate: allergies, current medications, past family history, past medical history, past social history, past surgical history and problem list.    Review of Systems   Constitutional: Positive for chills and fatigue.   HENT: Positive for congestion, postnasal drip and sore throat. Negative for sinus pain and sinus pressure.    Respiratory: Positive for cough.    Cardiovascular: Negative.        Objective   Vitals:    03/01/18 1446   BP: 102/64   Pulse: 84   Temp: 99 °F (37.2 °C)   SpO2: 90%     Results for orders placed or performed in visit on 03/01/18   POCT rapid strep A   Result Value Ref Range    Rapid Strep A Screen Negative Negative, VALID, INVALID, Not Performed    Internal Control Passed Passed    Lot Number [id number]     Expiration Date 07/31/2019    POCT Influenza A/B   Result Value Ref Range    Rapid Influenza A Ag negative     Rapid Influenza B Ag negative     Internal Control Passed Passed    Lot Number [id number]     Expiration Date 11/08/2020        Physical Exam   Constitutional: Vital signs are normal. She appears well-developed and well-nourished.   HENT:   Right Ear: Ear canal normal. A middle ear effusion is present.   Left Ear: Tympanic membrane and ear canal normal.   Mouth/Throat: Oropharynx is clear and moist and mucous membranes are normal.   Cardiovascular: Normal rate, regular rhythm and normal heart sounds.    Pulmonary/Chest: Effort normal and  breath sounds normal.   Lymphadenopathy:     She has no cervical adenopathy.   Skin: Skin is warm, dry and intact.   Psychiatric: She has a normal mood and affect. Her speech is normal and behavior is normal. Thought content normal.   Nursing note and vitals reviewed.      Assessment/Plan   Laine was seen today for uri.    Diagnoses and all orders for this visit:    Acute URI  -     amoxicillin (AMOXIL) 875 MG tablet; Take 1 tablet by mouth 2 (Two) Times a Day.  -     POCT rapid strep A  -     POCT Influenza A/B    Patient will also take Mucinex DM and drink plenty of water.

## 2018-03-19 ENCOUNTER — TELEPHONE (OUTPATIENT)
Dept: INTERNAL MEDICINE | Facility: CLINIC | Age: 63
End: 2018-03-19

## 2018-03-19 RX ORDER — SITAGLIPTIN 100 MG/1
100 TABLET, FILM COATED ORAL DAILY
Qty: 30 TABLET | Refills: 2 | Status: SHIPPED | OUTPATIENT
Start: 2018-03-19 | End: 2018-06-15 | Stop reason: SDUPTHER

## 2018-03-19 NOTE — TELEPHONE ENCOUNTER
Pt aware of medication change. PA was requested & approved. Pt will be paying $25.92 out of pocket for this new medication for #30 day supply.

## 2018-03-19 NOTE — TELEPHONE ENCOUNTER
----- Message from RUSSELL Clemente sent at 3/16/2018  9:03 AM EDT -----  Regarding: RE: MED SAMPLES  Contact: [phone]  Please send in Januvia 100 mg daily and make sure she has a savings card. Thank you!    ----- Message -----  From: Shelli Adler MA  Sent: 3/15/2018   4:46 PM  To: RUSSELL Clemente  Subject: RE: MED SAMPLES                                  Faxiga 10mg once daily is way too expensive locally & through [facility] (over a thousand dollars).    Januvia 100mg once daily locally is $175.92 (this is without the savings card). I can send a prescription to see what it what end up being if that's what you would like to try to do?     ----- Message -----  From: RUSSELL Clemente  Sent: 3/15/2018  10:07 AM  To: Shelli Adler MA  Subject: RE: MED SAMPLES                                  Is there any way to find out how much Januvia 100 mg or farxiga 10 mg would cost her or do I need to prescribe them in order to find this out?    ----- Message -----  From: Shelli Adler MA  Sent: 3/15/2018  10:01 AM  To: RUSSELL Clemente  Subject: RE: MED SAMPLES                                  She has tried metformin in the past & was unable to tolerate this according to your note from 2/13/2018.    ----- Message -----  From: RUSSELL Clemente  Sent: 3/12/2018   8:54 AM  To: Shelli Adler MA  Subject: RE: MED SAMPLES                                  Has she ever tried Metformin? I don't think that she has. If not, I would like her to take metformin  mg bid.     ----- Message -----  From: Shelli Adler MA  Sent: 3/8/2018   3:57 PM  To: RUSSELL Clemente  Subject: FW: MED SAMPLES                                  I gave this patient two samples of the victoza but she is on a high deductible plan. With insurance & savings card, they got this to $700. She says that this is way too high for her & wants to know what alternative you'd like to switch to besides victoza after she uses these samples.      ----- Message -----     From: Renee Irving     Sent: 3/8/2018   3:21 PM       To: Shelli Adler MA  Subject: MED SAMPLES                                      Patient was given samples of Victoza and went to get the script filled and it was $800. She wants to know if she can have more samples or some way to get a big discount. Please call her at [phone]. Thanks

## 2018-06-15 RX ORDER — SITAGLIPTIN 100 MG/1
TABLET, FILM COATED ORAL
Qty: 30 TABLET | Refills: 1 | Status: SHIPPED | OUTPATIENT
Start: 2018-06-15 | End: 2018-08-17 | Stop reason: SDUPTHER

## 2018-07-27 ENCOUNTER — TRANSCRIBE ORDERS (OUTPATIENT)
Dept: ADMINISTRATIVE | Facility: HOSPITAL | Age: 63
End: 2018-07-27

## 2018-07-27 DIAGNOSIS — Z12.39 SCREENING BREAST EXAMINATION: Primary | ICD-10-CM

## 2018-07-31 DIAGNOSIS — E78.5 HYPERLIPIDEMIA, UNSPECIFIED HYPERLIPIDEMIA TYPE: ICD-10-CM

## 2018-07-31 RX ORDER — ROSUVASTATIN CALCIUM 5 MG/1
TABLET, COATED ORAL
Qty: 30 TABLET | Refills: 5 | Status: SHIPPED | OUTPATIENT
Start: 2018-07-31 | End: 2018-09-17 | Stop reason: SINTOL

## 2018-08-13 RX ORDER — SITAGLIPTIN 100 MG/1
TABLET, FILM COATED ORAL
Qty: 30 TABLET | Refills: 0 | OUTPATIENT
Start: 2018-08-13

## 2018-08-17 RX ORDER — SITAGLIPTIN 100 MG/1
TABLET, FILM COATED ORAL
Qty: 30 TABLET | Refills: 0 | OUTPATIENT
Start: 2018-08-17

## 2018-08-17 RX ORDER — SITAGLIPTIN 100 MG/1
100 TABLET, FILM COATED ORAL DAILY
Qty: 30 TABLET | Refills: 5 | Status: SHIPPED | OUTPATIENT
Start: 2018-08-17 | End: 2018-09-17

## 2018-08-17 NOTE — TELEPHONE ENCOUNTER
----- Message from Renee Irving sent at 8/17/2018  3:08 PM EDT -----  Regarding: RE: MED REFILL  Laine scheduled her appt in September. She is leaving to go out of town for 2 weeks and needs her medication before she leaves. Thanks    ----- Message -----  From: Shelli Adler MA  Sent: 8/17/2018   2:43 PM  To: Renee Irving  Subject: RE: MED REFILL                                   Can you call her to schedule labs & a follow up? Pt is due. I can send in a rf after she schedules.     ----- Message -----  From: Renee Irving  Sent: 8/17/2018   2:25 PM  To: Shelli Adler MA  Subject: MED REFILL                                       Patient is following up on a refill request from [facility] for her Solisuvia. She was checking on the status of getting it filled. Thanks

## 2018-09-11 ENCOUNTER — OFFICE VISIT (OUTPATIENT)
Dept: INTERNAL MEDICINE | Facility: CLINIC | Age: 63
End: 2018-09-11

## 2018-09-11 VITALS
WEIGHT: 170.19 LBS | SYSTOLIC BLOOD PRESSURE: 106 MMHG | HEIGHT: 60 IN | HEART RATE: 90 BPM | BODY MASS INDEX: 33.41 KG/M2 | OXYGEN SATURATION: 95 % | DIASTOLIC BLOOD PRESSURE: 62 MMHG

## 2018-09-11 DIAGNOSIS — E11.9 TYPE 2 DIABETES MELLITUS WITHOUT COMPLICATION, WITHOUT LONG-TERM CURRENT USE OF INSULIN (HCC): ICD-10-CM

## 2018-09-11 DIAGNOSIS — E78.5 HYPERLIPIDEMIA, UNSPECIFIED HYPERLIPIDEMIA TYPE: Primary | ICD-10-CM

## 2018-09-11 DIAGNOSIS — I10 ESSENTIAL HYPERTENSION: ICD-10-CM

## 2018-09-11 PROCEDURE — 99214 OFFICE O/P EST MOD 30 MIN: CPT | Performed by: NURSE PRACTITIONER

## 2018-09-11 RX ORDER — PILOCARPINE HYDROCHLORIDE 20 MG/ML
1 SOLUTION/ DROPS OPHTHALMIC 2 TIMES DAILY
COMMUNITY
Start: 2018-07-26

## 2018-09-11 RX ORDER — DORZOLAMIDE HCL 20 MG/ML
1 SOLUTION/ DROPS OPHTHALMIC 2 TIMES DAILY
COMMUNITY
Start: 2018-09-04 | End: 2019-12-05

## 2018-09-11 RX ORDER — BETAXOLOL HYDROCHLORIDE 5 MG/ML
1 SOLUTION/ DROPS OPHTHALMIC 2 TIMES DAILY
COMMUNITY
Start: 2018-08-25 | End: 2019-12-05

## 2018-09-11 RX ORDER — DILTIAZEM HYDROCHLORIDE 300 MG/1
1 CAPSULE, EXTENDED RELEASE ORAL DAILY
COMMUNITY
Start: 2018-08-14 | End: 2022-03-28

## 2018-09-11 RX ORDER — BRIMONIDINE TARTRATE 0.15 %
1 DROPS OPHTHALMIC (EYE) 2 TIMES DAILY
COMMUNITY
Start: 2018-08-31

## 2018-09-11 RX ORDER — METOPROLOL SUCCINATE 50 MG/1
1 TABLET, EXTENDED RELEASE ORAL DAILY
COMMUNITY
Start: 2018-08-28 | End: 2022-03-28

## 2018-09-11 RX ORDER — APIXABAN 5 MG/1
1 TABLET, FILM COATED ORAL 2 TIMES DAILY
COMMUNITY
Start: 2018-08-13

## 2018-09-11 NOTE — PROGRESS NOTES
Subjective   Laine Thomas is a 63 y.o. female. Patient is here today for   Chief Complaint   Patient presents with   • Diabetes     Pt here to follow up on DM. Pt would like to discuss Januvia.    • Hypertension     Pt here to follow up on HTN.    • Hyperlipidemia     Pt here to follow up on HPL.    .    History of Present Illness   Patient is here to follow up on hyperlipidemia and is taking medication as prescribed. She is getting feet cramping most nights.     Here to follow up on diabetes which is controlled on current medications. Denies any problems with low glucose readings. She is exercising some. Januvia is expensive and she is wondering if there is a cheaper medication that she can take.     Patient is here to follow up on hypertension which is controlled with current medications. Denies chest pain, headaches.      The following portions of the patient's history were reviewed and updated as appropriate: allergies, current medications, past family history, past medical history, past social history, past surgical history and problem list.    Review of Systems   Constitutional: Negative.    Respiratory: Negative.    Cardiovascular: Negative.    Endocrine: Negative.    Psychiatric/Behavioral: Negative.        Objective   Vitals:    09/11/18 0949   BP: 106/62   Pulse: 90   SpO2: 95%     Physical Exam   Constitutional: Vital signs are normal. She appears well-developed and well-nourished.   Cardiovascular: Normal rate, regular rhythm and normal heart sounds.    Pulses:       Radial pulses are 2+ on the right side, and 2+ on the left side.   Pulmonary/Chest: Effort normal and breath sounds normal.   Skin: Skin is warm, dry and intact.   Psychiatric: She has a normal mood and affect. Her speech is normal and behavior is normal. Thought content normal.   Nursing note and vitals reviewed.      Assessment/Plan   Laine was seen today for diabetes, hypertension and hyperlipidemia.    Diagnoses and all orders for this  visit:    Hyperlipidemia, unspecified hyperlipidemia type  -     Comprehensive Metabolic Panel  -     Lipid Panel With / Chol / HDL Ratio    Type 2 diabetes mellitus without complication, without long-term current use of insulin (CMS/[facility])  -     Comprehensive Metabolic Panel  -     Hemoglobin A1c  -     MicroAlbumin, Urine, Random - Urine, Clean Catch    Essential hypertension  -     Comprehensive Metabolic Panel        DM - will try metformin again. Patient doesn't think that she has taken this medication, but there is documentation that she had some stomach issues with it in the past.     HLD - will switch to pravastatin to see if her leg cramps improve.     Will get fasting labs today. Then f/u in 6 months with fasting labs prior.

## 2018-09-12 LAB
ALBUMIN SERPL-MCNC: 4.8 G/DL (ref 3.5–5.2)
ALBUMIN/GLOB SERPL: 1.7 G/DL
ALP SERPL-CCNC: 127 U/L (ref 39–117)
ALT SERPL-CCNC: 27 U/L (ref 1–33)
AST SERPL-CCNC: 22 U/L (ref 1–32)
BILIRUB SERPL-MCNC: 0.5 MG/DL (ref 0.1–1.2)
BUN SERPL-MCNC: 17 MG/DL (ref 8–23)
BUN/CREAT SERPL: 20.7 (ref 7–25)
CALCIUM SERPL-MCNC: 10.6 MG/DL (ref 8.6–10.5)
CHLORIDE SERPL-SCNC: 100 MMOL/L (ref 98–107)
CHOLEST SERPL-MCNC: 141 MG/DL (ref 0–200)
CHOLEST/HDLC SERPL: 3.07 {RATIO}
CO2 SERPL-SCNC: 31.6 MMOL/L (ref 22–29)
CREAT SERPL-MCNC: 0.82 MG/DL (ref 0.57–1)
GLOBULIN SER CALC-MCNC: 2.9 GM/DL
GLUCOSE SERPL-MCNC: 147 MG/DL (ref 65–99)
HBA1C MFR BLD: 7.3 % (ref 4.8–5.6)
HDLC SERPL-MCNC: 46 MG/DL (ref 40–60)
LDLC SERPL CALC-MCNC: 62 MG/DL (ref 0–100)
MICROALBUMIN UR-MCNC: 14.6 UG/ML
POTASSIUM SERPL-SCNC: 4.5 MMOL/L (ref 3.5–5.2)
PROT SERPL-MCNC: 7.7 G/DL (ref 6–8.5)
SODIUM SERPL-SCNC: 142 MMOL/L (ref 136–145)
TRIGL SERPL-MCNC: 164 MG/DL (ref 0–150)
VLDLC SERPL CALC-MCNC: 32.8 MG/DL (ref 5–40)

## 2018-09-17 ENCOUNTER — TELEPHONE (OUTPATIENT)
Dept: INTERNAL MEDICINE | Facility: CLINIC | Age: 63
End: 2018-09-17

## 2018-09-17 DIAGNOSIS — E78.49 OTHER HYPERLIPIDEMIA: ICD-10-CM

## 2018-09-17 DIAGNOSIS — E11.9 TYPE 2 DIABETES MELLITUS WITHOUT COMPLICATION, WITHOUT LONG-TERM CURRENT USE OF INSULIN (HCC): Primary | ICD-10-CM

## 2018-09-17 RX ORDER — PRAVASTATIN SODIUM 20 MG
20 TABLET ORAL DAILY
Qty: 30 TABLET | Refills: 3 | Status: SHIPPED | OUTPATIENT
Start: 2018-09-17 | End: 2019-01-15 | Stop reason: SDUPTHER

## 2018-09-17 RX ORDER — METFORMIN HYDROCHLORIDE 500 MG/1
TABLET, EXTENDED RELEASE ORAL
Qty: 120 TABLET | Refills: 3 | Status: SHIPPED | OUTPATIENT
Start: 2018-09-17 | End: 2018-12-20 | Stop reason: SDUPTHER

## 2018-09-17 NOTE — TELEPHONE ENCOUNTER
----- Message from RUSSELL Clemente sent at 9/13/2018  4:50 PM EDT -----  Please let patient know that her hgba1c and lipids have improved. I would like to make the following medication changes:  Discontinue januvia (patient states that it will be too expensive when she switches to Medicare). Start Metformin  mg twice daily.   Discontinue crestor (due to muscle cramping) and start pravastatin 20 mg po daily. Recheck HgbA1C, CMP, and lipids in 3 months (keep lab and follow up appointment in 6 months)

## 2018-11-06 ENCOUNTER — TELEPHONE (OUTPATIENT)
Dept: INTERNAL MEDICINE | Facility: CLINIC | Age: 63
End: 2018-11-06

## 2018-11-06 DIAGNOSIS — E11.9 TYPE 2 DIABETES MELLITUS WITHOUT COMPLICATION, WITHOUT LONG-TERM CURRENT USE OF INSULIN (HCC): ICD-10-CM

## 2018-11-06 DIAGNOSIS — E78.49 OTHER HYPERLIPIDEMIA: ICD-10-CM

## 2018-11-06 NOTE — TELEPHONE ENCOUNTER
Pt aware to decrease metformin to one tablet by mouth twice a day instead of 2. Pt did want to get her labs drawn a month early instead of waiting for another month.

## 2018-11-06 NOTE — TELEPHONE ENCOUNTER
----- Message from RUSSELL Clemente sent at 11/5/2018  3:43 PM EST -----  I'd like to decrease it to one tablet twice daily instead of 2 tablets twice daily.     ----- Message -----  From: Shelli Adler MA  Sent: 11/5/2018   1:00 PM  To: RUSSELL Clemente    Pt called to let us know that her metformin has been giving her diarrhea daily. She is tolerating this okay for the most part but would like to know if theres anything else suggested that would not be as expensive as her old name brand med & easier on her stomach. I told her I'd ask & let her know.

## 2018-11-14 LAB
ALBUMIN SERPL-MCNC: 4.5 G/DL (ref 3.5–5.2)
ALBUMIN/GLOB SERPL: 1.4 G/DL
ALP SERPL-CCNC: 104 U/L (ref 39–117)
ALT SERPL-CCNC: 25 U/L (ref 1–33)
AST SERPL-CCNC: 21 U/L (ref 1–32)
BILIRUB SERPL-MCNC: 0.4 MG/DL (ref 0.1–1.2)
BUN SERPL-MCNC: 19 MG/DL (ref 8–23)
BUN/CREAT SERPL: 20.4 (ref 7–25)
CALCIUM SERPL-MCNC: 9.9 MG/DL (ref 8.6–10.5)
CHLORIDE SERPL-SCNC: 105 MMOL/L (ref 98–107)
CHOLEST SERPL-MCNC: 160 MG/DL (ref 0–200)
CHOLEST/HDLC SERPL: 3.14 {RATIO}
CO2 SERPL-SCNC: 25.2 MMOL/L (ref 22–29)
CREAT SERPL-MCNC: 0.93 MG/DL (ref 0.57–1)
GLOBULIN SER CALC-MCNC: 3.3 GM/DL
GLUCOSE SERPL-MCNC: 148 MG/DL (ref 65–99)
HBA1C MFR BLD: 6.74 % (ref 4.8–5.6)
HDLC SERPL-MCNC: 51 MG/DL (ref 40–60)
LDLC SERPL CALC-MCNC: 76 MG/DL (ref 0–100)
POTASSIUM SERPL-SCNC: 4 MMOL/L (ref 3.5–5.2)
PROT SERPL-MCNC: 7.8 G/DL (ref 6–8.5)
SODIUM SERPL-SCNC: 141 MMOL/L (ref 136–145)
TRIGL SERPL-MCNC: 166 MG/DL (ref 0–150)
VLDLC SERPL CALC-MCNC: 33.2 MG/DL (ref 5–40)

## 2018-12-20 RX ORDER — METFORMIN HYDROCHLORIDE 500 MG/1
500 TABLET, EXTENDED RELEASE ORAL 2 TIMES DAILY
Qty: 60 TABLET | Refills: 5 | Status: SHIPPED | OUTPATIENT
Start: 2018-12-20 | End: 2019-11-04 | Stop reason: SDUPTHER

## 2019-01-15 RX ORDER — PRAVASTATIN SODIUM 20 MG
TABLET ORAL
Qty: 30 TABLET | Refills: 5 | Status: SHIPPED | OUTPATIENT
Start: 2019-01-15 | End: 2019-05-24 | Stop reason: SDUPTHER

## 2019-03-05 ENCOUNTER — TELEPHONE (OUTPATIENT)
Dept: INTERNAL MEDICINE | Facility: CLINIC | Age: 64
End: 2019-03-05

## 2019-03-05 DIAGNOSIS — E78.5 HYPERLIPIDEMIA, UNSPECIFIED HYPERLIPIDEMIA TYPE: Primary | ICD-10-CM

## 2019-03-05 DIAGNOSIS — E11.9 TYPE 2 DIABETES MELLITUS WITHOUT COMPLICATION, WITHOUT LONG-TERM CURRENT USE OF INSULIN (HCC): ICD-10-CM

## 2019-03-05 NOTE — TELEPHONE ENCOUNTER
----- Message from Renee Irving sent at 3/4/2019  2:26 PM EST -----  Regarding: LAB/APPT  Contact: [phone]  Patient would like to discuss her upcoming labs and follow-up appt. She wants to also let you know about a procedure she is having done in April. Please call her at [phone]. thanks

## 2019-03-05 NOTE — TELEPHONE ENCOUNTER
Pt wanted us to know that she is having a cardiac ablation done at the end of April. She wanted to extend the date of her labs but cardiac wanted to keep the two draws separate. Pt will keep her normal lab appt this Thursday for our labs to be done.

## 2019-03-07 LAB
ALBUMIN SERPL-MCNC: 4.3 G/DL (ref 3.5–5.2)
ALBUMIN/GLOB SERPL: 1.5 G/DL
ALP SERPL-CCNC: 100 U/L (ref 39–117)
ALT SERPL-CCNC: 17 U/L (ref 1–33)
AST SERPL-CCNC: 15 U/L (ref 1–32)
BILIRUB SERPL-MCNC: 0.2 MG/DL (ref 0.1–1.2)
BUN SERPL-MCNC: 18 MG/DL (ref 8–23)
BUN/CREAT SERPL: 20.7 (ref 7–25)
CALCIUM SERPL-MCNC: 9.9 MG/DL (ref 8.6–10.5)
CHLORIDE SERPL-SCNC: 106 MMOL/L (ref 98–107)
CHOLEST SERPL-MCNC: 204 MG/DL (ref 0–200)
CHOLEST/HDLC SERPL: 4.86 {RATIO}
CO2 SERPL-SCNC: 23.3 MMOL/L (ref 22–29)
CREAT SERPL-MCNC: 0.87 MG/DL (ref 0.57–1)
GLOBULIN SER CALC-MCNC: 2.8 GM/DL
GLUCOSE SERPL-MCNC: 146 MG/DL (ref 65–99)
HBA1C MFR BLD: 7.3 % (ref 4.8–5.6)
HDLC SERPL-MCNC: 42 MG/DL (ref 40–60)
LDLC SERPL CALC-MCNC: 117 MG/DL (ref 0–100)
POTASSIUM SERPL-SCNC: 4.3 MMOL/L (ref 3.5–5.2)
PROT SERPL-MCNC: 7.1 G/DL (ref 6–8.5)
SODIUM SERPL-SCNC: 141 MMOL/L (ref 136–145)
TRIGL SERPL-MCNC: 224 MG/DL (ref 0–150)
VLDLC SERPL CALC-MCNC: 44.8 MG/DL (ref 5–40)

## 2019-03-18 ENCOUNTER — OFFICE VISIT (OUTPATIENT)
Dept: INTERNAL MEDICINE | Facility: CLINIC | Age: 64
End: 2019-03-18

## 2019-03-18 VITALS
OXYGEN SATURATION: 96 % | WEIGHT: 169 LBS | BODY MASS INDEX: 33.18 KG/M2 | HEART RATE: 76 BPM | SYSTOLIC BLOOD PRESSURE: 114 MMHG | DIASTOLIC BLOOD PRESSURE: 76 MMHG | HEIGHT: 60 IN

## 2019-03-18 DIAGNOSIS — Z23 NEED FOR VACCINATION WITH 13-POLYVALENT PNEUMOCOCCAL CONJUGATE VACCINE: ICD-10-CM

## 2019-03-18 DIAGNOSIS — E78.5 HYPERLIPIDEMIA, UNSPECIFIED HYPERLIPIDEMIA TYPE: Primary | ICD-10-CM

## 2019-03-18 DIAGNOSIS — E11.9 TYPE 2 DIABETES MELLITUS WITHOUT COMPLICATION, WITHOUT LONG-TERM CURRENT USE OF INSULIN (HCC): ICD-10-CM

## 2019-03-18 PROCEDURE — 99214 OFFICE O/P EST MOD 30 MIN: CPT | Performed by: NURSE PRACTITIONER

## 2019-03-18 PROCEDURE — 90670 PCV13 VACCINE IM: CPT | Performed by: NURSE PRACTITIONER

## 2019-03-18 PROCEDURE — G0009 ADMIN PNEUMOCOCCAL VACCINE: HCPCS | Performed by: NURSE PRACTITIONER

## 2019-03-18 RX ORDER — ACETAZOLAMIDE 250 MG/1
1 TABLET ORAL 2 TIMES DAILY
COMMUNITY
Start: 2019-02-14 | End: 2023-01-03

## 2019-03-18 NOTE — PROGRESS NOTES
Subjective   Laine Thomas is a 64 y.o. female. Patient is here today for   Chief Complaint   Patient presents with   • Diabetes     Pt here to follow up on DM.    • Hyperlipidemia     Pt here to follow up on HPL.    .    History of Present Illness   Here to follow up on diabetes which is controlled on current medications. Denies any problems with low glucose readings.  States that she has not been watching her diet as well as she should.    Patient is here to follow up on hyperlipidemia. She has tried atorvastatin and pravastatin with side effects of muscle cramps. She stopped pravastatin about a month ago.     The following portions of the patient's history were reviewed and updated as appropriate: allergies, current medications, past family history, past medical history, past social history, past surgical history and problem list.    Review of Systems   Constitutional: Negative.    Respiratory: Negative.    Cardiovascular: Negative.    Musculoskeletal: Positive for myalgias.   Psychiatric/Behavioral: Negative.        Objective     Vitals:    03/18/19 1316   BP: 114/76   Pulse: 76   SpO2: 96%       Telephone on 03/05/2019   Component Date Value Ref Range Status   • Glucose 03/07/2019 146* 65 - 99 mg/dL Final   • BUN 03/07/2019 18  8 - 23 mg/dL Final   • Creatinine 03/07/2019 0.87  0.57 - 1.00 mg/dL Final   • eGFR Non  Am 03/07/2019 65  >60 mL/min/1.73 Final   • eGFR African Am 03/07/2019 79  >60 mL/min/1.73 Final   • BUN/Creatinine Ratio 03/07/2019 20.7  7.0 - 25.0 Final   • Sodium 03/07/2019 141  136 - 145 mmol/L Final   • Potassium 03/07/2019 4.3  3.5 - 5.2 mmol/L Final   • Chloride 03/07/2019 106  98 - 107 mmol/L Final   • Total CO2 03/07/2019 23.3  22.0 - 29.0 mmol/L Final   • Calcium 03/07/2019 9.9  8.6 - 10.5 mg/dL Final   • Total Protein 03/07/2019 7.1  6.0 - 8.5 g/dL Final   • Albumin 03/07/2019 4.30  3.50 - 5.20 g/dL Final   • Globulin 03/07/2019 2.8  gm/dL Final   • A/G Ratio 03/07/2019 1.5  g/dL  Final   • Total Bilirubin 03/07/2019 0.2  0.1 - 1.2 mg/dL Final   • Alkaline Phosphatase 03/07/2019 100  39 - 117 U/L Final   • AST (SGOT) 03/07/2019 15  1 - 32 U/L Final   • ALT (SGPT) 03/07/2019 17  1 - 33 U/L Final   • Total Cholesterol 03/07/2019 204* 0 - 200 mg/dL Final   • Triglycerides 03/07/2019 224* 0 - 150 mg/dL Final   • HDL Cholesterol 03/07/2019 42  40 - 60 mg/dL Final   • VLDL Cholesterol 03/07/2019 44.8* 5 - 40 mg/dL Final   • LDL Cholesterol  03/07/2019 117* 0 - 100 mg/dL Final   • Chol/HDL Ratio 03/07/2019 4.86   Final   • Hemoglobin A1C 03/07/2019 7.30* 4.80 - 5.60 % Final    Comment: Hemoglobin A1C Ranges:  Increased Risk for Diabetes  5.7% to 6.4%  Diabetes                     >= 6.5%  Diabetic Goal                < 7.0%       Reviewed labs with patient.     Physical Exam   Constitutional: She is oriented to person, place, and time. Vital signs are normal. She appears well-developed and well-nourished.   Cardiovascular: Normal rate and normal heart sounds. An irregular rhythm present.   No peripheral edema   Pulmonary/Chest: Effort normal and breath sounds normal.   Neurological: She is alert and oriented to person, place, and time.   Skin: Skin is warm, dry and intact.   Psychiatric: She has a normal mood and affect. Her speech is normal and behavior is normal. Thought content normal.   Nursing note and vitals reviewed.      Assessment/Plan   Laine was seen today for diabetes and hyperlipidemia.    Diagnoses and all orders for this visit:    Hyperlipidemia, unspecified hyperlipidemia type  -     Comprehensive Metabolic Panel; Future  -     Lipid Panel With / Chol / HDL Ratio; Future    Type 2 diabetes mellitus without complication, without long-term current use of insulin (CMS/[facility])  -     Comprehensive Metabolic Panel; Future  -     Hemoglobin A1c; Future    Need for vaccination with 13-polyvalent pneumococcal conjugate vaccine  -     Pneumococcal Conjugate Vaccine 13-Valent All  (PCV13)        Hyperlipidemia - Try taking 1/2 of pravastatin. Recheck labs in 3 months.    DM - patient will improve her diet and continue with metformin  mg twice daily. Recheck labs in 3 months    Patient is scheduled for cardiac ablation end of April

## 2019-05-24 RX ORDER — PRAVASTATIN SODIUM 20 MG
20 TABLET ORAL DAILY
Qty: 45 TABLET | Refills: 1 | Status: SHIPPED | OUTPATIENT
Start: 2019-05-24 | End: 2020-06-05 | Stop reason: SINTOL

## 2019-06-05 DIAGNOSIS — E78.5 HYPERLIPIDEMIA, UNSPECIFIED HYPERLIPIDEMIA TYPE: ICD-10-CM

## 2019-06-05 DIAGNOSIS — E11.9 TYPE 2 DIABETES MELLITUS WITHOUT COMPLICATION, WITHOUT LONG-TERM CURRENT USE OF INSULIN (HCC): ICD-10-CM

## 2019-06-05 LAB
ALBUMIN SERPL-MCNC: 4.5 G/DL (ref 3.5–5.2)
ALBUMIN/GLOB SERPL: 1.7 G/DL
ALP SERPL-CCNC: 119 U/L (ref 39–117)
ALT SERPL-CCNC: 11 U/L (ref 1–33)
AST SERPL-CCNC: 15 U/L (ref 1–32)
BILIRUB SERPL-MCNC: 0.4 MG/DL (ref 0.2–1.2)
BUN SERPL-MCNC: 20 MG/DL (ref 8–23)
BUN/CREAT SERPL: 22.2 (ref 7–25)
CALCIUM SERPL-MCNC: 10.4 MG/DL (ref 8.6–10.5)
CHLORIDE SERPL-SCNC: 105 MMOL/L (ref 98–107)
CHOLEST SERPL-MCNC: 170 MG/DL (ref 0–200)
CHOLEST/HDLC SERPL: 4.05 {RATIO}
CO2 SERPL-SCNC: 25.6 MMOL/L (ref 22–29)
CREAT SERPL-MCNC: 0.9 MG/DL (ref 0.57–1)
GLOBULIN SER CALC-MCNC: 2.7 GM/DL
GLUCOSE SERPL-MCNC: 127 MG/DL (ref 65–99)
HBA1C MFR BLD: 6.7 % (ref 4.8–5.6)
HDLC SERPL-MCNC: 42 MG/DL (ref 40–60)
LDLC SERPL CALC-MCNC: 99 MG/DL (ref 0–100)
POTASSIUM SERPL-SCNC: 4.7 MMOL/L (ref 3.5–5.2)
PROT SERPL-MCNC: 7.2 G/DL (ref 6–8.5)
SODIUM SERPL-SCNC: 142 MMOL/L (ref 136–145)
TRIGL SERPL-MCNC: 144 MG/DL (ref 0–150)
VLDLC SERPL CALC-MCNC: 28.8 MG/DL

## 2019-11-04 ENCOUNTER — TELEPHONE (OUTPATIENT)
Dept: INTERNAL MEDICINE | Facility: CLINIC | Age: 64
End: 2019-11-04

## 2019-11-04 RX ORDER — METFORMIN HYDROCHLORIDE 500 MG/1
500 TABLET, EXTENDED RELEASE ORAL 2 TIMES DAILY
Qty: 60 TABLET | Refills: 0 | Status: SHIPPED | OUTPATIENT
Start: 2019-11-04 | End: 2019-12-05 | Stop reason: SDUPTHER

## 2019-11-04 NOTE — TELEPHONE ENCOUNTER
PT CALLED REQUESTING REFILL ON METFORMIN.  I TOLD HER SHE IS ACTUALLY OVERDUE FOR A FOLLOW UP AND LABS.  SHE SAID SHE DON'T HAVE HER CALLANDER WITH HER RIGHT NOW BUT SHE WILL CALL BACK TOMORROW TO SCHEDULE.  TX SENT TO PHARMACY.

## 2019-12-02 DIAGNOSIS — E11.9 TYPE 2 DIABETES MELLITUS WITHOUT COMPLICATION, WITHOUT LONG-TERM CURRENT USE OF INSULIN (HCC): Primary | ICD-10-CM

## 2019-12-02 DIAGNOSIS — I10 ESSENTIAL HYPERTENSION: ICD-10-CM

## 2019-12-03 LAB
ALBUMIN SERPL-MCNC: 4.4 G/DL (ref 3.5–5.2)
ALBUMIN/GLOB SERPL: 1.7 G/DL
ALP SERPL-CCNC: 107 U/L (ref 39–117)
ALT SERPL-CCNC: 19 U/L (ref 1–33)
AST SERPL-CCNC: 18 U/L (ref 1–32)
BILIRUB SERPL-MCNC: 0.4 MG/DL (ref 0.2–1.2)
BUN SERPL-MCNC: 17 MG/DL (ref 8–23)
BUN/CREAT SERPL: 14.9 (ref 7–25)
CALCIUM SERPL-MCNC: 9.4 MG/DL (ref 8.6–10.5)
CHLORIDE SERPL-SCNC: 107 MMOL/L (ref 98–107)
CHOLEST SERPL-MCNC: 136 MG/DL (ref 0–200)
CO2 SERPL-SCNC: 22.5 MMOL/L (ref 22–29)
CREAT SERPL-MCNC: 1.14 MG/DL (ref 0.57–1)
GLOBULIN SER CALC-MCNC: 2.6 GM/DL
GLUCOSE SERPL-MCNC: 201 MG/DL (ref 65–99)
HBA1C MFR BLD: 7 % (ref 4.8–5.6)
HDLC SERPL-MCNC: 37 MG/DL (ref 40–60)
LDLC SERPL CALC-MCNC: 72 MG/DL (ref 0–100)
LDLC/HDLC SERPL: 1.94 {RATIO}
MICROALBUMIN UR-MCNC: 95.8 UG/ML
POTASSIUM SERPL-SCNC: 4.1 MMOL/L (ref 3.5–5.2)
PROT SERPL-MCNC: 7 G/DL (ref 6–8.5)
SODIUM SERPL-SCNC: 142 MMOL/L (ref 136–145)
TRIGL SERPL-MCNC: 137 MG/DL (ref 0–150)
VLDLC SERPL CALC-MCNC: 27.4 MG/DL

## 2019-12-05 ENCOUNTER — HOSPITAL ENCOUNTER (OUTPATIENT)
Dept: GENERAL RADIOLOGY | Facility: HOSPITAL | Age: 64
Discharge: HOME | End: 2019-12-05
Admitting: NURSE PRACTITIONER

## 2019-12-05 ENCOUNTER — OFFICE VISIT (OUTPATIENT)
Dept: INTERNAL MEDICINE | Facility: CLINIC | Age: 64
End: 2019-12-05

## 2019-12-05 VITALS
HEIGHT: 60 IN | SYSTOLIC BLOOD PRESSURE: 110 MMHG | OXYGEN SATURATION: 89 % | DIASTOLIC BLOOD PRESSURE: 72 MMHG | BODY MASS INDEX: 33.28 KG/M2 | RESPIRATION RATE: 16 BRPM | WEIGHT: 169.5 LBS | TEMPERATURE: 99.4 F | HEART RATE: 98 BPM

## 2019-12-05 DIAGNOSIS — E78.5 HYPERLIPIDEMIA, UNSPECIFIED HYPERLIPIDEMIA TYPE: ICD-10-CM

## 2019-12-05 DIAGNOSIS — E11.9 TYPE 2 DIABETES MELLITUS WITHOUT COMPLICATION, WITHOUT LONG-TERM CURRENT USE OF INSULIN (HCC): Primary | ICD-10-CM

## 2019-12-05 DIAGNOSIS — J44.9 CHRONIC OBSTRUCTIVE PULMONARY DISEASE, UNSPECIFIED COPD TYPE (HCC): ICD-10-CM

## 2019-12-05 DIAGNOSIS — I10 ESSENTIAL HYPERTENSION: ICD-10-CM

## 2019-12-05 PROCEDURE — 99214 OFFICE O/P EST MOD 30 MIN: CPT | Performed by: NURSE PRACTITIONER

## 2019-12-05 PROCEDURE — 71046 X-RAY EXAM CHEST 2 VIEWS: CPT

## 2019-12-05 RX ORDER — METFORMIN HYDROCHLORIDE 500 MG/1
500 TABLET, EXTENDED RELEASE ORAL 2 TIMES DAILY
Qty: 180 TABLET | Refills: 3 | Status: SHIPPED | OUTPATIENT
Start: 2019-12-05 | End: 2020-12-07

## 2019-12-05 NOTE — PROGRESS NOTES
"Subjective   Laine Thomas is a 64 y.o. female. Patient is here today for   Chief Complaint   Patient presents with   • Hypertension   • Diabetes   • Hyperlipidemia   • Shortness of Breath     PT STATES SHE IS HAVING HARD TIME BREATHING AND SOB X 2 DAYS.   .    History of Present Illness   Patient is here to follow up on hypertension which is controlled with current medications. Denies chest pain, headaches. Had a cardiac ablation in May. She is on Eliquis 5 mg twice daily.    Here to follow up on diabetes which is controlled on current medications. Denies any problems with low glucose readings. She is currently on metformin     Patient is here to follow up on hyperlipidemia and is taking medication as prescribed with no side effects. She is taking 1/2 tab of pravastatin 20 mg.   She is also taking Magnesium and coQ10 each evening which has helped with her muscle cramps.     C/o shortness of breath x 2 days associated with a dry cough, low grade temp. She has a call into her pulmonologist. She is on 3L of O2 per nasal cannula, which she usually only uses at night. Denies nasal congestion. Denies sick contacts. States that she \"just doesn't feel good.\"    The following portions of the patient's history were reviewed and updated as appropriate: allergies, current medications, past family history, past medical history, past social history, past surgical history and problem list.    Review of Systems   Constitutional: Positive for fatigue.   HENT: Negative.    Respiratory: Positive for cough and shortness of breath. Negative for wheezing.    Cardiovascular: Negative.    Endocrine: Negative.    Musculoskeletal: Negative.        Objective     Vitals:    12/05/19 1120   BP:    Pulse:    Resp:    Temp:    SpO2: (!) 89%     Body mass index is 33.1 kg/m².    Orders Only on 12/02/2019   Component Date Value Ref Range Status   • Glucose 12/02/2019 201* 65 - 99 mg/dL Final   • BUN 12/02/2019 17  8 - 23 mg/dL Final   • " Creatinine 12/02/2019 1.14* 0.57 - 1.00 mg/dL Final   • eGFR Non  Am 12/02/2019 48* >60 mL/min/1.73 Final   • eGFR African Am 12/02/2019 58* >60 mL/min/1.73 Final   • BUN/Creatinine Ratio 12/02/2019 14.9  7.0 - 25.0 Final   • Sodium 12/02/2019 142  136 - 145 mmol/L Final   • Potassium 12/02/2019 4.1  3.5 - 5.2 mmol/L Final   • Chloride 12/02/2019 107  98 - 107 mmol/L Final   • Total CO2 12/02/2019 22.5  22.0 - 29.0 mmol/L Final   • Calcium 12/02/2019 9.4  8.6 - 10.5 mg/dL Final   • Total Protein 12/02/2019 7.0  6.0 - 8.5 g/dL Final   • Albumin 12/02/2019 4.40  3.50 - 5.20 g/dL Final   • Globulin 12/02/2019 2.6  gm/dL Final   • A/G Ratio 12/02/2019 1.7  g/dL Final   • Total Bilirubin 12/02/2019 0.4  0.2 - 1.2 mg/dL Final   • Alkaline Phosphatase 12/02/2019 107  39 - 117 U/L Final   • AST (SGOT) 12/02/2019 18  1 - 32 U/L Final   • ALT (SGPT) 12/02/2019 19  1 - 33 U/L Final   • Total Cholesterol 12/02/2019 136  0 - 200 mg/dL Final   • Triglycerides 12/02/2019 137  0 - 150 mg/dL Final   • HDL Cholesterol 12/02/2019 37* 40 - 60 mg/dL Final   • VLDL Cholesterol 12/02/2019 27.4  mg/dL Final   • LDL Cholesterol  12/02/2019 72  0 - 100 mg/dL Final   • LDL/HDL Ratio 12/02/2019 1.94   Final   • Hemoglobin A1C 12/02/2019 7.00* 4.80 - 5.60 % Final    Comment: Hemoglobin A1C Ranges:  Increased Risk for Diabetes  5.7% to 6.4%  Diabetes                     >= 6.5%  Diabetic Goal                < 7.0%     • Microalbumin, Urine 12/02/2019 95.8  Not Estab. ug/mL Final     Reviewed labs with patient.     Physical Exam   Constitutional: Vital signs are normal. She appears well-developed and well-nourished.   Cardiovascular: Normal rate, regular rhythm and normal heart sounds.   Pulmonary/Chest: Accessory muscle usage present. No respiratory distress. She has decreased breath sounds in the right lower field and the left lower field.   Skin: Skin is warm, dry and intact.   Psychiatric: She has a normal mood and affect. Her speech  is normal and behavior is normal. Thought content normal.   Nursing note and vitals reviewed.      Assessment/Plan   Laine was seen today for hypertension, diabetes, hyperlipidemia and shortness of breath.    Diagnoses and all orders for this visit:    Type 2 diabetes mellitus without complication, without long-term current use of insulin (CMS/[facility])  -     metFORMIN ER (GLUCOPHAGE-XR) 500 MG 24 hr tablet; Take 1 tablet by mouth 2 (Two) Times a Day.    Chronic obstructive pulmonary disease, unspecified COPD type (CMS/HCC)  -     XR Chest PA & Lateral; Future    Hyperlipidemia, unspecified hyperlipidemia type    Essential hypertension    No changes in medications at this time.    COPD - Patient will get a chest x-ray. Will send results to Dr. Maude Osuna, patient's pulmonologist. She is to go to the ER if her symptoms worsen    3:55 pm X-ray results are not available at this time. Called patient. States that she is doing a little better and has an appointment with Dr. Osuna in the morning.         Current Outpatient Medications:   •  albuterol (PROVENTIL HFA;VENTOLIN HFA) 108 (90 BASE) MCG/ACT inhaler, Inhale 2 puffs Every 4 (Four) Hours As Needed for Wheezing., Disp: 18 g, Rfl: 3  •  BEVESPI AEROSPHERE 9-4.8 MCG/ACT aerosol, Inhale 2 puffs 2 (Two) Times a Day., Disp: , Rfl:   •  brimonidine (ALPHAGAN) 0.15 % ophthalmic solution, Administer 1 drop to both eyes 2 (Two) Times a Day., Disp: , Rfl:   •  Calcium Carb-Cholecalciferol (CALCIUM 600 + D PO), Take 1 tablet by mouth daily., Disp: , Rfl:   •  CARTIA  MG 24 hr capsule, Take 1 capsule by mouth Daily., Disp: , Rfl:   •  Cholecalciferol (VITAMIN D3) 125 MCG (5000 UT) capsule capsule, Take 5,000 Units by mouth Daily., Disp: , Rfl:   •  ELIQUIS 5 MG tablet tablet, Take 1 tablet by mouth 2 (Two) Times a Day., Disp: , Rfl:   •  glucose monitor monitoring kit, 1 each Daily. Use to check glucose daily, Disp: 1 each, Rfl: 0  •  MAGNESIUM PO, Take 1 tablet by mouth  Every Night., Disp: , Rfl:   •  metFORMIN ER (GLUCOPHAGE-XR) 500 MG 24 hr tablet, Take 1 tablet by mouth 2 (Two) Times a Day., Disp: 180 tablet, Rfl: 3  •  metoprolol succinate XL (TOPROL-XL) 50 MG 24 hr tablet, Take 1 tablet by mouth Daily., Disp: , Rfl:   •  Multiple Vitamin (MULTI VITAMIN DAILY PO), Take 1 tablet by mouth daily., Disp: , Rfl:   •  pilocarpine (PILOCAR) 1 % ophthalmic solution, Administer 1 drop to both eyes 2 (Two) Times a Day., Disp: , Rfl:   •  pravastatin (PRAVACHOL) 20 MG tablet, Take 1 tablet by mouth Daily. TAKE 0.5 TAB BY MOUTH DAILY, Disp: 45 tablet, Rfl: 1  •  acetaZOLAMIDE (DIAMOX) 250 MG tablet, Take 1 tablet by mouth 2 (Two) Times a Day., Disp: , Rfl:

## 2020-05-26 DIAGNOSIS — E11.9 TYPE 2 DIABETES MELLITUS WITHOUT COMPLICATION, WITHOUT LONG-TERM CURRENT USE OF INSULIN (HCC): Primary | ICD-10-CM

## 2020-05-26 DIAGNOSIS — E78.5 HYPERLIPIDEMIA, UNSPECIFIED HYPERLIPIDEMIA TYPE: ICD-10-CM

## 2020-05-26 DIAGNOSIS — I10 ESSENTIAL HYPERTENSION: ICD-10-CM

## 2020-06-01 LAB
ALBUMIN SERPL-MCNC: 4.7 G/DL (ref 3.8–4.8)
ALBUMIN/GLOB SERPL: 1.4 {RATIO} (ref 1.2–2.2)
ALP SERPL-CCNC: 114 IU/L (ref 39–117)
ALT SERPL-CCNC: 19 IU/L (ref 0–32)
AST SERPL-CCNC: 30 IU/L (ref 0–40)
BILIRUB SERPL-MCNC: 0.4 MG/DL (ref 0–1.2)
BUN SERPL-MCNC: 27 MG/DL (ref 8–27)
BUN/CREAT SERPL: 21 (ref 12–28)
CALCIUM SERPL-MCNC: 10.2 MG/DL (ref 8.7–10.3)
CHLORIDE SERPL-SCNC: 103 MMOL/L (ref 96–106)
CHOLEST SERPL-MCNC: 237 MG/DL (ref 100–199)
CHOLEST/HDLC SERPL: 5.5 RATIO (ref 0–4.4)
CO2 SERPL-SCNC: 20 MMOL/L (ref 20–29)
CREAT SERPL-MCNC: 1.31 MG/DL (ref 0.57–1)
GLOBULIN SER CALC-MCNC: 3.4 G/DL (ref 1.5–4.5)
GLUCOSE SERPL-MCNC: 147 MG/DL (ref 65–99)
HBA1C MFR BLD: 7.2 % (ref 4.8–5.6)
HDLC SERPL-MCNC: 43 MG/DL
LDLC SERPL CALC-MCNC: 151 MG/DL (ref 0–99)
MICROALBUMIN UR-MCNC: NORMAL UG/ML
POTASSIUM SERPL-SCNC: 4.8 MMOL/L (ref 3.5–5.2)
PROT SERPL-MCNC: 8.1 G/DL (ref 6–8.5)
SODIUM SERPL-SCNC: 139 MMOL/L (ref 134–144)
SPECIMEN STATUS: NORMAL
TRIGL SERPL-MCNC: 214 MG/DL (ref 0–149)
VLDLC SERPL CALC-MCNC: 43 MG/DL (ref 5–40)

## 2020-06-05 ENCOUNTER — OFFICE VISIT (OUTPATIENT)
Dept: INTERNAL MEDICINE | Facility: CLINIC | Age: 65
End: 2020-06-05

## 2020-06-05 VITALS
WEIGHT: 157 LBS | SYSTOLIC BLOOD PRESSURE: 104 MMHG | TEMPERATURE: 97 F | DIASTOLIC BLOOD PRESSURE: 68 MMHG | OXYGEN SATURATION: 95 % | HEIGHT: 60 IN | BODY MASS INDEX: 30.82 KG/M2 | HEART RATE: 60 BPM

## 2020-06-05 DIAGNOSIS — E11.9 TYPE 2 DIABETES MELLITUS WITHOUT COMPLICATION, WITHOUT LONG-TERM CURRENT USE OF INSULIN (HCC): ICD-10-CM

## 2020-06-05 DIAGNOSIS — I10 ESSENTIAL HYPERTENSION: ICD-10-CM

## 2020-06-05 DIAGNOSIS — Z23 NEED FOR 23-POLYVALENT PNEUMOCOCCAL POLYSACCHARIDE VACCINE: ICD-10-CM

## 2020-06-05 DIAGNOSIS — E78.5 HYPERLIPIDEMIA, UNSPECIFIED HYPERLIPIDEMIA TYPE: Primary | ICD-10-CM

## 2020-06-05 PROBLEM — Z95.0 CARDIAC PACEMAKER IN SITU: Status: ACTIVE | Noted: 2020-06-05

## 2020-06-05 PROBLEM — E66.9 OBESITY (BMI 30-39.9): Status: ACTIVE | Noted: 2020-01-02

## 2020-06-05 PROBLEM — I50.9 CHF (CONGESTIVE HEART FAILURE) (HCC): Status: ACTIVE | Noted: 2020-01-02

## 2020-06-05 PROBLEM — H40.9 GLAUCOMA: Status: ACTIVE | Noted: 2020-06-05

## 2020-06-05 PROBLEM — J96.01 ACUTE RESPIRATORY FAILURE WITH HYPOXEMIA (HCC): Status: ACTIVE | Noted: 2020-01-02

## 2020-06-05 PROBLEM — G47.30 SLEEP APNEA: Status: ACTIVE | Noted: 2020-06-05

## 2020-06-05 PROCEDURE — G0009 ADMIN PNEUMOCOCCAL VACCINE: HCPCS | Performed by: NURSE PRACTITIONER

## 2020-06-05 PROCEDURE — 90732 PPSV23 VACC 2 YRS+ SUBQ/IM: CPT | Performed by: NURSE PRACTITIONER

## 2020-06-05 PROCEDURE — 99214 OFFICE O/P EST MOD 30 MIN: CPT | Performed by: NURSE PRACTITIONER

## 2020-06-05 RX ORDER — SPIRONOLACTONE 25 MG/1
1 TABLET ORAL DAILY
COMMUNITY
Start: 2020-04-27

## 2020-06-05 RX ORDER — COLESEVELAM 180 1/1
1875 TABLET ORAL 2 TIMES DAILY WITH MEALS
Qty: 180 TABLET | Refills: 3 | Status: SHIPPED | OUTPATIENT
Start: 2020-06-05 | End: 2020-06-12

## 2020-06-05 RX ORDER — DORZOLAMIDE HCL 20 MG/ML
1 SOLUTION/ DROPS OPHTHALMIC 2 TIMES DAILY
COMMUNITY
Start: 2018-09-04

## 2020-06-05 RX ORDER — BUMETANIDE 1 MG/1
2 TABLET ORAL DAILY
COMMUNITY
Start: 2020-04-27

## 2020-06-05 NOTE — PROGRESS NOTES
Subjective   Laine Thomas is a 65 y.o. female. Patient is here today for   Chief Complaint   Patient presents with   • Diabetes     Pt here to follow up on DM.    • Hyperlipidemia     Pt here to follow up on HPL. Pt reports foot/leg cramps from her statin. Pt has discontinued this med.    • Hypertension     Pt here to follow up on HTN.    .    History of Present Illness   Here to follow up on diabetes which is controlled on current medications. Denies any problems with low glucose readings.     Patient is here to follow up on hyperlipidemia.  She had to stop the Livalo because it was causing muscle cramps. She has tried several statins with similar side effects.     Patient is here to follow up on hypertension which is controlled with current medications. Denies chest pain, headaches.     The following portions of the patient's history were reviewed and updated as appropriate: allergies, current medications, past family history, past medical history, past social history, past surgical history and problem list.    Review of Systems   Constitutional: Negative.    Respiratory: Negative.    Cardiovascular: Negative.    Psychiatric/Behavioral: Negative.        Objective     Vitals:    06/05/20 0829   BP: 104/68   Pulse: 60   Temp: 97 °F (36.1 °C)   SpO2: 95%     Body mass index is 30.66 kg/m².    Orders Only on 05/26/2020   Component Date Value Ref Range Status   • Glucose 05/28/2020 147* 65 - 99 mg/dL Final   • BUN 05/28/2020 27  8 - 27 mg/dL Final   • Creatinine 05/28/2020 1.31* 0.57 - 1.00 mg/dL Final   • eGFR Non African Am 05/28/2020 42* >59 mL/min/1.73 Final   • eGFR African Am 05/28/2020 49* >59 mL/min/1.73 Final   • BUN/Creatinine Ratio 05/28/2020 21  12 - 28 Final   • Sodium 05/28/2020 139  134 - 144 mmol/L Final   • Potassium 05/28/2020 4.8  3.5 - 5.2 mmol/L Final   • Chloride 05/28/2020 103  96 - 106 mmol/L Final   • Total CO2 05/28/2020 20  20 - 29 mmol/L Final   • Calcium 05/28/2020 10.2  8.7 - 10.3 mg/dL  Final   • Total Protein 05/28/2020 8.1  6.0 - 8.5 g/dL Final   • Albumin 05/28/2020 4.7  3.8 - 4.8 g/dL Final   • Globulin 05/28/2020 3.4  1.5 - 4.5 g/dL Final   • A/G Ratio 05/28/2020 1.4  1.2 - 2.2 Final   • Total Bilirubin 05/28/2020 0.4  0.0 - 1.2 mg/dL Final   • Alkaline Phosphatase 05/28/2020 114  39 - 117 IU/L Final   • AST (SGOT) 05/28/2020 30  0 - 40 IU/L Final   • ALT (SGPT) 05/28/2020 19  0 - 32 IU/L Final   • Total Cholesterol 05/28/2020 237* 100 - 199 mg/dL Final   • Triglycerides 05/28/2020 214* 0 - 149 mg/dL Final   • HDL Cholesterol 05/28/2020 43  >39 mg/dL Final   • VLDL Cholesterol 05/28/2020 43* 5 - 40 mg/dL Final   • LDL Cholesterol  05/28/2020 151* 0 - 99 mg/dL Final   • Chol/HDL Ratio 05/28/2020 5.5* 0.0 - 4.4 ratio Final    Comment:                                   T. Chol/HDL Ratio                                              Men  Women                                1/2 Avg.Risk  3.4    3.3                                    Avg.Risk  5.0    4.4                                 2X Avg.Risk  9.6    7.1                                 3X Avg.Risk 23.4   11.0     • Hemoglobin A1C 05/28/2020 7.2* 4.8 - 5.6 % Final    Comment:          Prediabetes: 5.7 - 6.4           Diabetes: >6.4           Glycemic control for adults with diabetes: <7.0     • Microalbumin, Urine 05/28/2020 CANCELED  ug/mL Final-Edited    Comment: No urine specimen received.    Result canceled by the ancillary.     • Specimen Status 05/28/2020 CANCELED   Final-Edited    Comment: No urine specimen received.        TEST:  [id number]  Albumin, Random Urine    Result canceled by the ancillary.       Reviewed labs with patient.     Physical Exam   Constitutional: Vital signs are normal. She appears well-developed and well-nourished.   Cardiovascular: Normal rate, regular rhythm and normal heart sounds.   Pulmonary/Chest: Effort normal and breath sounds normal.   Skin: Skin is warm, dry and intact.   Psychiatric: She has a normal mood  and affect. Her speech is normal and behavior is normal. Thought content normal.   Nursing note and vitals reviewed.      Assessment/Plan   Laine was seen today for diabetes, hyperlipidemia and hypertension.    Diagnoses and all orders for this visit:    Hyperlipidemia, unspecified hyperlipidemia type  -     colesevelam (Welchol) 625 MG tablet; Take 3 tablets by mouth 2 (Two) Times a Day With Meals.    Type 2 diabetes mellitus without complication, without long-term current use of insulin (CMS/[facility])  -     Microalbumin / Creatinine Urine Ratio - Urine, Clean Catch    Need for 23-polyvalent pneumococcal polysaccharide vaccine  -     Pneumococcal Polysaccharide Vaccine 23-Valent (PPSV23) Greater Than or Equal To 3yo Subcutaneous / IM    Essential hypertension      HLD - Will try Welchol. F/u in 4 months for Medicare Wellness exam with fasting labs prior.     No other medication changes at this time         Current Outpatient Medications:   •  acetaZOLAMIDE (DIAMOX) 250 MG tablet, Take 1 tablet by mouth 2 (Two) Times a Day., Disp: , Rfl:   •  albuterol (PROVENTIL HFA;VENTOLIN HFA) 108 (90 BASE) MCG/ACT inhaler, Inhale 2 puffs Every 4 (Four) Hours As Needed for Wheezing., Disp: 18 g, Rfl: 3  •  brimonidine (ALPHAGAN) 0.15 % ophthalmic solution, Administer 1 drop to both eyes 2 (Two) Times a Day., Disp: , Rfl:   •  bumetanide (BUMEX) 1 MG tablet, Take 1 tablet by mouth Daily., Disp: , Rfl:   •  Calcium Carb-Cholecalciferol (CALCIUM 600 + D PO), Take 1 tablet by mouth daily., Disp: , Rfl:   •  CARTIA  MG 24 hr capsule, Take 1 capsule by mouth Daily., Disp: , Rfl:   •  Cholecalciferol (VITAMIN D3) 125 MCG (5000 UT) capsule capsule, Take 5,000 Units by mouth Daily., Disp: , Rfl:   •  Coenzyme Q10 10 MG capsule, Take 200 mg by mouth Daily., Disp: , Rfl:   •  dorzolamide (TRUSOPT) 2 % ophthalmic solution, Apply 1 drop to eye(s) as directed by provider 2 (two) times a day., Disp: , Rfl:   •  ELIQUIS 5 MG tablet tablet,  Take 1 tablet by mouth 2 (Two) Times a Day., Disp: , Rfl:   •  glucose monitor monitoring kit, 1 each Daily. Use to check glucose daily, Disp: 1 each, Rfl: 0  •  MAGNESIUM PO, Take 1 tablet by mouth Every Night., Disp: , Rfl:   •  metFORMIN ER (GLUCOPHAGE-XR) 500 MG 24 hr tablet, Take 1 tablet by mouth 2 (Two) Times a Day., Disp: 180 tablet, Rfl: 3  •  metoprolol succinate XL (TOPROL-XL) 50 MG 24 hr tablet, Take 1 tablet by mouth Daily., Disp: , Rfl:   •  Multiple Vitamin (MULTI VITAMIN DAILY PO), Take 1 tablet by mouth daily., Disp: , Rfl:   •  pilocarpine (PILOCAR) 1 % ophthalmic solution, Administer 1 drop to both eyes 2 (Two) Times a Day., Disp: , Rfl:   •  spironolactone (ALDACTONE) 25 MG tablet, Take 1 tablet by mouth Daily., Disp: , Rfl:   •  TRELEGY ELLIPTA 100-62.5-25 MCG/INH aerosol powder , Inhale 1 puff Daily., Disp: , Rfl:   •  colesevelam (Welchol) 625 MG tablet, Take 3 tablets by mouth 2 (Two) Times a Day With Meals., Disp: 180 tablet, Rfl: 3

## 2020-06-06 LAB
ALBUMIN/CREAT UR: <12 MG/G CREAT (ref 0–29)
CREAT UR-MCNC: 25.1 MG/DL
MICROALBUMIN UR-MCNC: <3 UG/ML

## 2020-06-11 ENCOUNTER — TELEPHONE (OUTPATIENT)
Dept: INTERNAL MEDICINE | Facility: CLINIC | Age: 65
End: 2020-06-11

## 2020-06-11 NOTE — TELEPHONE ENCOUNTER
Patient called and stated that colesevelam (Welchol) 625 MG tablet is over $80 and patient cannot afford that. Patient would like a call back to discuss an alternative     Please advise   [phone]

## 2020-06-12 RX ORDER — ROSUVASTATIN CALCIUM 5 MG
5 TABLET ORAL DAILY
Qty: 30 TABLET | Refills: 2 | Status: SHIPPED | OUTPATIENT
Start: 2020-06-12 | End: 2020-10-13

## 2020-10-06 DIAGNOSIS — E78.5 HYPERLIPIDEMIA, UNSPECIFIED HYPERLIPIDEMIA TYPE: ICD-10-CM

## 2020-10-06 DIAGNOSIS — E55.9 VITAMIN D DEFICIENCY: ICD-10-CM

## 2020-10-06 DIAGNOSIS — Z00.00 HEALTH CARE MAINTENANCE: Primary | ICD-10-CM

## 2020-10-06 DIAGNOSIS — E11.9 TYPE 2 DIABETES MELLITUS WITHOUT COMPLICATION, WITHOUT LONG-TERM CURRENT USE OF INSULIN (HCC): ICD-10-CM

## 2020-10-08 LAB
25(OH)D3+25(OH)D2 SERPL-MCNC: 70.6 NG/ML (ref 30–100)
ALBUMIN SERPL-MCNC: 4.7 G/DL (ref 3.5–5.2)
ALBUMIN/GLOB SERPL: 1.8 G/DL
ALP SERPL-CCNC: 129 U/L (ref 39–117)
ALT SERPL-CCNC: 21 U/L (ref 1–33)
AST SERPL-CCNC: 19 U/L (ref 1–32)
BILIRUB SERPL-MCNC: 0.3 MG/DL (ref 0–1.2)
BUN SERPL-MCNC: 32 MG/DL (ref 8–23)
BUN/CREAT SERPL: 25.8 (ref 7–25)
CALCIUM SERPL-MCNC: 10 MG/DL (ref 8.6–10.5)
CHLORIDE SERPL-SCNC: 105 MMOL/L (ref 98–107)
CHOLEST SERPL-MCNC: 225 MG/DL (ref 0–200)
CHOLEST/HDLC SERPL: 4.89 {RATIO}
CO2 SERPL-SCNC: 24.8 MMOL/L (ref 22–29)
CREAT SERPL-MCNC: 1.24 MG/DL (ref 0.57–1)
GLOBULIN SER CALC-MCNC: 2.6 GM/DL
GLUCOSE SERPL-MCNC: 144 MG/DL (ref 65–99)
HBA1C MFR BLD: 6.9 % (ref 4.8–5.6)
HDLC SERPL-MCNC: 46 MG/DL (ref 40–60)
LDLC SERPL CALC-MCNC: 151 MG/DL (ref 0–100)
POTASSIUM SERPL-SCNC: 5.2 MMOL/L (ref 3.5–5.2)
PROT SERPL-MCNC: 7.3 G/DL (ref 6–8.5)
SODIUM SERPL-SCNC: 141 MMOL/L (ref 136–145)
TRIGL SERPL-MCNC: 139 MG/DL (ref 0–150)
VLDLC SERPL CALC-MCNC: 27.8 MG/DL

## 2020-10-13 ENCOUNTER — OFFICE VISIT (OUTPATIENT)
Dept: INTERNAL MEDICINE | Facility: CLINIC | Age: 65
End: 2020-10-13

## 2020-10-13 VITALS
HEART RATE: 70 BPM | TEMPERATURE: 96.8 F | HEIGHT: 60 IN | SYSTOLIC BLOOD PRESSURE: 104 MMHG | WEIGHT: 151 LBS | BODY MASS INDEX: 29.64 KG/M2 | OXYGEN SATURATION: 97 % | DIASTOLIC BLOOD PRESSURE: 56 MMHG

## 2020-10-13 DIAGNOSIS — E78.5 HYPERLIPIDEMIA, UNSPECIFIED HYPERLIPIDEMIA TYPE: Primary | ICD-10-CM

## 2020-10-13 DIAGNOSIS — Z23 NEED FOR INFLUENZA VACCINATION: ICD-10-CM

## 2020-10-13 DIAGNOSIS — E11.9 TYPE 2 DIABETES MELLITUS WITHOUT COMPLICATION, WITHOUT LONG-TERM CURRENT USE OF INSULIN (HCC): ICD-10-CM

## 2020-10-13 DIAGNOSIS — E55.9 VITAMIN D DEFICIENCY: ICD-10-CM

## 2020-10-13 PROCEDURE — G0008 ADMIN INFLUENZA VIRUS VAC: HCPCS | Performed by: NURSE PRACTITIONER

## 2020-10-13 PROCEDURE — 90694 VACC AIIV4 NO PRSRV 0.5ML IM: CPT | Performed by: NURSE PRACTITIONER

## 2020-10-13 PROCEDURE — 99214 OFFICE O/P EST MOD 30 MIN: CPT | Performed by: NURSE PRACTITIONER

## 2020-10-13 PROCEDURE — G0439 PPPS, SUBSEQ VISIT: HCPCS | Performed by: NURSE PRACTITIONER

## 2020-10-13 NOTE — PROGRESS NOTES
The ABCs of the Annual Wellness Visit  Subsequent Medicare Wellness Visit    Chief Complaint   Patient presents with   • Annual Exam     Pt here for medicare wellness.    • Hyperlipidemia   • Diabetes       Subjective   History of Present Illness:  Laine Thomas is a 65 y.o. female who presents for a Subsequent Medicare Wellness Visit.    HEALTH RISK ASSESSMENT    Recent Hospitalizations:  No hospitalization(s) within the last year. Yes - Jan 2020 pneumonia at [facility]    Current Medical Providers:  Patient Care Team:  Daya Bowser APRN as PCP - General (Family Medicine)  Maude Osuna MD (Pulmonary Disease)    Smoking Status:  Social History     Tobacco Use   Smoking Status Former Smoker   • Types: Cigarettes   Smokeless Tobacco Never Used   Tobacco Comment    last cigarette x6 months-9months ago.        Alcohol Consumption:  Social History     Substance and Sexual Activity   Alcohol Use Yes    Comment: RARE       Depression Screen:   PHQ-2/PHQ-9 Depression Screening 10/13/2020   Little interest or pleasure in doing things 0   Feeling down, depressed, or hopeless 0   Trouble falling or staying asleep, or sleeping too much 1   Feeling tired or having little energy 0   Poor appetite or overeating 0   Feeling bad about yourself - or that you are a failure or have let yourself or your family down 0   Trouble concentrating on things, such as reading the newspaper or watching television 0   Moving or speaking so slowly that other people could have noticed. Or the opposite - being so fidgety or restless that you have been moving around a lot more than usual 0   Thoughts that you would be better off dead, or of hurting yourself in some way 0   Total Score 1   If you checked off any problems, how difficult have these problems made it for you to do your work, take care of things at home, or get along with other people? Not difficult at all       Fall Risk Screen:  STEADI Fall Risk Assessment was completed, and patient is  at LOW risk for falls.Assessment completed on:10/13/2020    Health Habits and Functional and Cognitive Screening:  Functional & Cognitive Status 10/13/2020   Do you have difficulty preparing food and eating? No   Do you have difficulty bathing yourself, getting dressed or grooming yourself? No   Do you have difficulty using the toilet? No   Do you have difficulty moving around from place to place? No   Do you have trouble with steps or getting out of a bed or a chair? No   Current Diet Well Balanced Diet   Dental Exam Up to date   Eye Exam Up to date   Exercise (times per week) 3 times per week   Current Exercises Include Walking   Do you need help using the phone?  No   Are you deaf or do you have serious difficulty hearing?  No   Do you need help with transportation? No   Do you need help shopping? No   Do you need help preparing meals?  No   Do you need help with housework?  No   Do you need help with laundry? No   Do you need help taking your medications? No   Do you need help managing money? No   Do you ever drive or ride in a car without wearing a seat belt? No   Have you felt unusual stress, anger or loneliness in the last month? No   Who do you live with? Spouse   If you need help, do you have trouble finding someone available to you? No   Have you been bothered in the last four weeks by sexual problems? No   Do you have difficulty concentrating, remembering or making decisions? No         Does the patient have evidence of cognitive impairment? No    Asprin use counseling:Does not need ASA (and currently is not on it)    Age-appropriate Screening Schedule:  Refer to the list below for future screening recommendations based on patient's age, sex and/or medical conditions. Orders for these recommended tests are listed in the plan section. The patient has been provided with a written plan.    Health Maintenance   Topic Date Due   • ZOSTER VACCINE (1 of 2) 02/16/2004   • DIABETIC FOOT EXAM  02/13/2019   •  INFLUENZA VACCINE  08/01/2020   • DIABETIC EYE EXAM  12/01/2020   • MAMMOGRAM  01/11/2021   • HEMOGLOBIN A1C  04/07/2021   • URINE MICROALBUMIN  06/05/2021   • LIPID PANEL  10/07/2021   • DXA SCAN  01/04/2022   • TDAP/TD VACCINES (2 - Td) 01/01/2023   • COLONOSCOPY  12/06/2027          The following portions of the patient's history were reviewed and updated as appropriate: allergies, current medications, past family history, past medical history, past social history, past surgical history and problem list.    Outpatient Medications Prior to Visit   Medication Sig Dispense Refill   • acetaZOLAMIDE (DIAMOX) 250 MG tablet Take 1 tablet by mouth 2 (Two) Times a Day.     • albuterol (PROVENTIL HFA;VENTOLIN HFA) 108 (90 BASE) MCG/ACT inhaler Inhale 2 puffs Every 4 (Four) Hours As Needed for Wheezing. 18 g 3   • brimonidine (ALPHAGAN) 0.15 % ophthalmic solution Administer 1 drop to both eyes 2 (Two) Times a Day.     • bumetanide (BUMEX) 1 MG tablet Take 1 tablet by mouth Daily.     • Calcium Carb-Cholecalciferol (CALCIUM 600 + D PO) Take 1 tablet by mouth daily.     • CARTIA  MG 24 hr capsule Take 1 capsule by mouth Daily.     • Cholecalciferol (VITAMIN D3) 125 MCG (5000 UT) capsule capsule Take 5,000 Units by mouth Daily.     • Coenzyme Q10 10 MG capsule Take 200 mg by mouth Daily.     • dorzolamide (TRUSOPT) 2 % ophthalmic solution Apply 1 drop to eye(s) as directed by provider 2 (two) times a day.     • ELIQUIS 5 MG tablet tablet Take 1 tablet by mouth 2 (Two) Times a Day.     • Flaxseed, Linseed, (FLAX SEED OIL PO) Take 1 capsule by mouth Daily.     • glucose monitor monitoring kit 1 each Daily. Use to check glucose daily 1 each 0   • MAGNESIUM PO Take 1 tablet by mouth Every Night.     • metFORMIN ER (GLUCOPHAGE-XR) 500 MG 24 hr tablet Take 1 tablet by mouth 2 (Two) Times a Day. 180 tablet 3   • metoprolol succinate XL (TOPROL-XL) 50 MG 24 hr tablet Take 1 tablet by mouth Daily.     • Multiple Vitamin (MULTI  "VITAMIN DAILY PO) Take 1 tablet by mouth daily.     • Omega-3 Fatty Acids (FISH OIL PO) Take 1 capsule by mouth Daily.     • pilocarpine (PILOCAR) 1 % ophthalmic solution Administer 1 drop to both eyes 2 (Two) Times a Day.     • spironolactone (ALDACTONE) 25 MG tablet Take 1 tablet by mouth Daily.     • TRELEGY ELLIPTA 100-62.5-25 MCG/INH aerosol powder  Inhale 1 puff Daily.     • CRESTOR 5 MG tablet Take 1 tablet by mouth Daily. 30 tablet 2     No facility-administered medications prior to visit.        Patient Active Problem List   Diagnosis   • Chronic obstructive pulmonary disease (CMS/HCC)   • Gastroesophageal reflux disease   • Hypertension   • Insomnia   • Osteoporosis   • Acute pharyngitis   • Tobacco dependence syndrome   • Vitamin D deficiency   • Hyperlipidemia   • Nicotine abuse   • Postviral fatigue syndrome   • Type 2 diabetes mellitus without complication, without long-term current use of insulin (CMS/[facility])   • Atrial fibrillation (CMS/HCC)   • Diverticulosis of large intestine without hemorrhage   • Acute respiratory failure with hypoxemia (CMS/[facility])   • Cardiac pacemaker in situ   • CHF (congestive heart failure) (CMS/[facility])   • Glaucoma   • Obesity (BMI 30-39.9)   • Sleep apnea       Advanced Care Planning:  ACP discussion was held with the patient during this visit. Patient has an advance directive (not in EMR), copy requested.    Review of Systems    Compared to one year ago, the patient feels her physical health is better.  Compared to one year ago, the patient feels her mental health is better.    Reviewed chart for potential of high risk medication in the elderly: yes  Reviewed chart for potential of harmful drug interactions in the elderly:yes    Objective         Vitals:    10/13/20 0858   BP: 104/56   BP Location: Left arm   Patient Position: Sitting   Cuff Size: Adult   Pulse: 70   Temp: 96.8 °F (36 °C)   SpO2: 97%   Weight: 68.5 kg (151 lb)   Height: 152.4 cm (60\")   PainSc: 0-No pain "       Body mass index is 29.49 kg/m².  Discussed the patient's BMI with her. The BMI is above average; BMI management plan is completed.    Physical Exam    Lab Results   Component Value Date     (H) 10/07/2020    CHLPL 225 (H) 10/07/2020    TRIG 139 10/07/2020    HDL 46 10/07/2020     (H) 10/07/2020    VLDL 27.8 10/07/2020    HGBA1C 6.90 (H) 10/07/2020        Assessment/Plan   Medicare Risks and Personalized Health Plan  CMS Preventative Services Quick Reference  Advance Directive Discussion  Diabetic Lab Screening   Immunizations Discussed/Encouraged (specific immunizations; Influenza )    The above risks/problems have been discussed with the patient.  Pertinent information has been shared with the patient in the After Visit Summary.  Follow up plans and orders are seen below in the Assessment/Plan Section.    Diagnoses and all orders for this visit:    1. Hyperlipidemia, unspecified hyperlipidemia type (Primary)    2. Vitamin D deficiency    3. Type 2 diabetes mellitus without complication, without long-term current use of insulin (CMS/[facility])      Follow Up:  No follow-ups on file.     An After Visit Summary and PPPS were given to the patient.

## 2020-10-13 NOTE — PROGRESS NOTES
Subjective   Laine Thomas is a 65 y.o. female. Patient is here today for   Chief Complaint   Patient presents with   • Annual Exam     Pt here for medicare wellness.    • Hyperlipidemia   • Diabetes   .    History of Present Illness   Here to follow up on diabetes which is controlled on current medications. Denies any problems with low glucose readings.     Patient is here to follow up on hyperlipidemia. Crestor was causing toe and foot cramps with statins - wakes her up at night. She just started using Fish oil and flaxseed oil about a week ago to see if that will help. She walks on her treadmill 3 times a week for 30 minutes. She has lost 6 pounds since her last visit 4 months ago.    The following portions of the patient's history were reviewed and updated as appropriate: allergies, current medications, past family history, past medical history, past social history, past surgical history and problem list.    Review of Systems   Constitutional: Negative.    Respiratory: Negative.    Cardiovascular: Negative.    Endocrine: Negative.    Musculoskeletal: Myalgias: with statins.   Psychiatric/Behavioral: Negative.        Objective     Vitals:    10/13/20 0858   BP: 104/56   Pulse: 70   Temp: 96.8 °F (36 °C)   SpO2: 97%     Body mass index is 29.49 kg/m².    Orders Only on 10/06/2020   Component Date Value Ref Range Status   • Total Cholesterol 10/07/2020 225* 0 - 200 mg/dL Final   • Triglycerides 10/07/2020 139  0 - 150 mg/dL Final   • HDL Cholesterol 10/07/2020 46  40 - 60 mg/dL Final   • VLDL Cholesterol Honorio 10/07/2020 27.8  mg/dL Final   • LDL Chol Calc ([facility]) 10/07/2020 151* 0 - 100 mg/dL Final   • Chol/HDL Ratio 10/07/2020 4.89   Final   • Glucose 10/07/2020 144* 65 - 99 mg/dL Final   • BUN 10/07/2020 32* 8 - 23 mg/dL Final   • Creatinine 10/07/2020 1.24* 0.57 - 1.00 mg/dL Final   • eGFR Non  Am 10/07/2020 43* >60 mL/min/1.73 Final   • eGFR African Am 10/07/2020 52* >60 mL/min/1.73 Final   •  BUN/Creatinine Ratio 10/07/2020 25.8* 7.0 - 25.0 Final   • Sodium 10/07/2020 141  136 - 145 mmol/L Final   • Potassium 10/07/2020 5.2  3.5 - 5.2 mmol/L Final   • Chloride 10/07/2020 105  98 - 107 mmol/L Final   • Total CO2 10/07/2020 24.8  22.0 - 29.0 mmol/L Final   • Calcium 10/07/2020 10.0  8.6 - 10.5 mg/dL Final   • Total Protein 10/07/2020 7.3  6.0 - 8.5 g/dL Final   • Albumin 10/07/2020 4.70  3.50 - 5.20 g/dL Final   • Globulin 10/07/2020 2.6  gm/dL Final   • A/G Ratio 10/07/2020 1.8  g/dL Final   • Total Bilirubin 10/07/2020 0.3  0.0 - 1.2 mg/dL Final   • Alkaline Phosphatase 10/07/2020 129* 39 - 117 U/L Final   • AST (SGOT) 10/07/2020 19  1 - 32 U/L Final   • ALT (SGPT) 10/07/2020 21  1 - 33 U/L Final   • Hemoglobin A1C 10/07/2020 6.90* 4.80 - 5.60 % Final    Comment: Hemoglobin A1C Ranges:  Increased Risk for Diabetes  5.7% to 6.4%  Diabetes                     >= 6.5%  Diabetic Goal                < 7.0%     • 25 Hydroxy, Vitamin D 10/07/2020 70.6  30.0 - 100.0 ng/ml Final    Comment: Results may be falsely increased if patient taking Biotin.  Reference Range for Total Vitamin D 25(OH)  Deficiency <20.0 ng/mL  Insufficiency 21-29 ng/mL  Sufficiency  ng/mL  Toxicity >100 ng/ml         Physical Exam  Vitals signs and nursing note reviewed.   Constitutional:       Appearance: Normal appearance. She is well-developed.   Cardiovascular:      Rate and Rhythm: Normal rate and regular rhythm.      Heart sounds: Normal heart sounds.   Pulmonary:      Effort: Pulmonary effort is normal.      Breath sounds: Normal breath sounds.   Abdominal:      General: Bowel sounds are normal.      Palpations: Abdomen is soft.      Tenderness: There is no abdominal tenderness.   Skin:     General: Skin is warm and dry.   Neurological:      Mental Status: She is alert and oriented to person, place, and time.   Psychiatric:         Speech: Speech normal.         Behavior: Behavior normal.         Thought Content: Thought  content normal.       Diabetic foot exam:   Left: Filament test present   Pulses Dorsalis Pedis:  present          Right: Filament test present   Pulses Dorsalis Pedis:  present       Assessment/Plan   Diagnoses and all orders for this visit:    1. Hyperlipidemia, unspecified hyperlipidemia type (Primary)    2. Vitamin D deficiency    3. Type 2 diabetes mellitus without complication, without long-term current use of insulin (CMS/[facility])      No medication changes.     Renal insufficiency - increase water intake. Recheck in 6 months. Unable to add ACE due to low BP           Current Outpatient Medications:   •  acetaZOLAMIDE (DIAMOX) 250 MG tablet, Take 1 tablet by mouth 2 (Two) Times a Day., Disp: , Rfl:   •  albuterol (PROVENTIL HFA;VENTOLIN HFA) 108 (90 BASE) MCG/ACT inhaler, Inhale 2 puffs Every 4 (Four) Hours As Needed for Wheezing., Disp: 18 g, Rfl: 3  •  brimonidine (ALPHAGAN) 0.15 % ophthalmic solution, Administer 1 drop to both eyes 2 (Two) Times a Day., Disp: , Rfl:   •  bumetanide (BUMEX) 1 MG tablet, Take 1 tablet by mouth Daily., Disp: , Rfl:   •  Calcium Carb-Cholecalciferol (CALCIUM 600 + D PO), Take 1 tablet by mouth daily., Disp: , Rfl:   •  CARTIA  MG 24 hr capsule, Take 1 capsule by mouth Daily., Disp: , Rfl:   •  Cholecalciferol (VITAMIN D3) 125 MCG (5000 UT) capsule capsule, Take 5,000 Units by mouth Daily., Disp: , Rfl:   •  Coenzyme Q10 10 MG capsule, Take 200 mg by mouth Daily., Disp: , Rfl:   •  dorzolamide (TRUSOPT) 2 % ophthalmic solution, Apply 1 drop to eye(s) as directed by provider 2 (two) times a day., Disp: , Rfl:   •  ELIQUIS 5 MG tablet tablet, Take 1 tablet by mouth 2 (Two) Times a Day., Disp: , Rfl:   •  Flaxseed, Linseed, (FLAX SEED OIL PO), Take 1 capsule by mouth Daily., Disp: , Rfl:   •  glucose monitor monitoring kit, 1 each Daily. Use to check glucose daily, Disp: 1 each, Rfl: 0  •  MAGNESIUM PO, Take 1 tablet by mouth Every Night., Disp: , Rfl:   •  metFORMIN ER  (GLUCOPHAGE-XR) 500 MG 24 hr tablet, Take 1 tablet by mouth 2 (Two) Times a Day., Disp: 180 tablet, Rfl: 3  •  metoprolol succinate XL (TOPROL-XL) 50 MG 24 hr tablet, Take 1 tablet by mouth Daily., Disp: , Rfl:   •  Multiple Vitamin (MULTI VITAMIN DAILY PO), Take 1 tablet by mouth daily., Disp: , Rfl:   •  Omega-3 Fatty Acids (FISH OIL PO), Take 1 capsule by mouth Daily., Disp: , Rfl:   •  pilocarpine (PILOCAR) 1 % ophthalmic solution, Administer 1 drop to both eyes 2 (Two) Times a Day., Disp: , Rfl:   •  spironolactone (ALDACTONE) 25 MG tablet, Take 1 tablet by mouth Daily., Disp: , Rfl:   •  TRELEGY ELLIPTA 100-62.5-25 MCG/INH aerosol powder , Inhale 1 puff Daily., Disp: , Rfl:

## 2020-12-07 DIAGNOSIS — E11.9 TYPE 2 DIABETES MELLITUS WITHOUT COMPLICATION, WITHOUT LONG-TERM CURRENT USE OF INSULIN (HCC): ICD-10-CM

## 2020-12-07 RX ORDER — METFORMIN HYDROCHLORIDE 500 MG/1
TABLET, EXTENDED RELEASE ORAL
Qty: 180 TABLET | Refills: 2 | Status: SHIPPED | OUTPATIENT
Start: 2020-12-07 | End: 2021-07-08

## 2021-04-12 DIAGNOSIS — E11.9 TYPE 2 DIABETES MELLITUS WITHOUT COMPLICATION, WITHOUT LONG-TERM CURRENT USE OF INSULIN (HCC): Primary | ICD-10-CM

## 2021-04-12 DIAGNOSIS — E78.5 HYPERLIPIDEMIA, UNSPECIFIED HYPERLIPIDEMIA TYPE: ICD-10-CM

## 2021-04-14 LAB
ALBUMIN SERPL-MCNC: 4.3 G/DL (ref 3.5–5.2)
ALBUMIN/GLOB SERPL: 1.7 G/DL
ALP SERPL-CCNC: 140 U/L (ref 39–117)
ALT SERPL-CCNC: 41 U/L (ref 1–33)
AST SERPL-CCNC: 32 U/L (ref 1–32)
BASOPHILS # BLD AUTO: 0.07 10*3/MM3 (ref 0–0.2)
BASOPHILS NFR BLD AUTO: 0.5 % (ref 0–1.5)
BILIRUB SERPL-MCNC: 0.4 MG/DL (ref 0–1.2)
BUN SERPL-MCNC: 37 MG/DL (ref 8–23)
BUN/CREAT SERPL: 24.8 (ref 7–25)
CALCIUM SERPL-MCNC: 10.2 MG/DL (ref 8.6–10.5)
CHLORIDE SERPL-SCNC: 105 MMOL/L (ref 98–107)
CHOLEST SERPL-MCNC: 147 MG/DL (ref 0–200)
CHOLEST/HDLC SERPL: 4.9 {RATIO}
CO2 SERPL-SCNC: 24 MMOL/L (ref 22–29)
CREAT SERPL-MCNC: 1.49 MG/DL (ref 0.57–1)
EOSINOPHIL # BLD AUTO: 0.16 10*3/MM3 (ref 0–0.4)
EOSINOPHIL NFR BLD AUTO: 1.2 % (ref 0.3–6.2)
ERYTHROCYTE [DISTWIDTH] IN BLOOD BY AUTOMATED COUNT: 13.1 % (ref 12.3–15.4)
GLOBULIN SER CALC-MCNC: 2.5 GM/DL
GLUCOSE SERPL-MCNC: 149 MG/DL (ref 65–99)
HBA1C MFR BLD: 7.5 % (ref 4.8–5.6)
HCT VFR BLD AUTO: 45.9 % (ref 34–46.6)
HDLC SERPL-MCNC: 30 MG/DL (ref 40–60)
HGB BLD-MCNC: 14.9 G/DL (ref 12–15.9)
IMM GRANULOCYTES # BLD AUTO: 0.06 10*3/MM3 (ref 0–0.05)
IMM GRANULOCYTES NFR BLD AUTO: 0.5 % (ref 0–0.5)
LDLC SERPL CALC-MCNC: 94 MG/DL (ref 0–100)
LYMPHOCYTES # BLD AUTO: 1.33 10*3/MM3 (ref 0.7–3.1)
LYMPHOCYTES NFR BLD AUTO: 10.2 % (ref 19.6–45.3)
MCH RBC QN AUTO: 32.3 PG (ref 26.6–33)
MCHC RBC AUTO-ENTMCNC: 32.5 G/DL (ref 31.5–35.7)
MCV RBC AUTO: 99.6 FL (ref 79–97)
MICROALBUMIN UR-MCNC: 430.3 UG/ML
MONOCYTES # BLD AUTO: 0.68 10*3/MM3 (ref 0.1–0.9)
MONOCYTES NFR BLD AUTO: 5.2 % (ref 5–12)
NEUTROPHILS # BLD AUTO: 10.71 10*3/MM3 (ref 1.7–7)
NEUTROPHILS NFR BLD AUTO: 82.4 % (ref 42.7–76)
NRBC BLD AUTO-RTO: 0 /100 WBC (ref 0–0.2)
PLATELET # BLD AUTO: 298 10*3/MM3 (ref 140–450)
POTASSIUM SERPL-SCNC: 4.9 MMOL/L (ref 3.5–5.2)
PROT SERPL-MCNC: 6.8 G/DL (ref 6–8.5)
RBC # BLD AUTO: 4.61 10*6/MM3 (ref 3.77–5.28)
SODIUM SERPL-SCNC: 139 MMOL/L (ref 136–145)
TRIGL SERPL-MCNC: 129 MG/DL (ref 0–150)
VLDLC SERPL CALC-MCNC: 23 MG/DL (ref 5–40)
WBC # BLD AUTO: 13.01 10*3/MM3 (ref 3.4–10.8)

## 2021-04-19 ENCOUNTER — HOSPITAL ENCOUNTER (OUTPATIENT)
Dept: GENERAL RADIOLOGY | Facility: HOSPITAL | Age: 66
Discharge: HOME | End: 2021-04-19
Admitting: NURSE PRACTITIONER

## 2021-04-19 ENCOUNTER — OFFICE VISIT (OUTPATIENT)
Dept: INTERNAL MEDICINE | Facility: CLINIC | Age: 66
End: 2021-04-19

## 2021-04-19 VITALS
TEMPERATURE: 96.8 F | HEIGHT: 60 IN | OXYGEN SATURATION: 94 % | BODY MASS INDEX: 29.32 KG/M2 | HEART RATE: 85 BPM | SYSTOLIC BLOOD PRESSURE: 128 MMHG | WEIGHT: 149.31 LBS | DIASTOLIC BLOOD PRESSURE: 62 MMHG

## 2021-04-19 DIAGNOSIS — E11.65 TYPE 2 DIABETES MELLITUS WITH HYPERGLYCEMIA, WITHOUT LONG-TERM CURRENT USE OF INSULIN (HCC): Primary | ICD-10-CM

## 2021-04-19 DIAGNOSIS — E78.5 HYPERLIPIDEMIA, UNSPECIFIED HYPERLIPIDEMIA TYPE: ICD-10-CM

## 2021-04-19 DIAGNOSIS — N28.9 RENAL INSUFFICIENCY: ICD-10-CM

## 2021-04-19 DIAGNOSIS — R06.09 DYSPNEA ON EXERTION: ICD-10-CM

## 2021-04-19 PROBLEM — I48.0 PAF (PAROXYSMAL ATRIAL FIBRILLATION): Status: ACTIVE | Noted: 2017-10-18

## 2021-04-19 PROCEDURE — 99214 OFFICE O/P EST MOD 30 MIN: CPT | Performed by: NURSE PRACTITIONER

## 2021-04-19 PROCEDURE — 71046 X-RAY EXAM CHEST 2 VIEWS: CPT

## 2021-04-19 NOTE — PROGRESS NOTES
Subjective   Laine Thomas is a 66 y.o. female. Patient is here today for   Chief Complaint   Patient presents with   • Hyperlipidemia   • Diabetes   .    History of Present Illness   Patient is here to follow up on hyperlipidemia and is taking medication as prescribed with no side effects.     Here to follow up on diabetes which is controlled on current medications. Denies any problems with low glucose readings.     C/o shortness of breath. She has talked to her cardiologist. She does have a dry cough. Covid vaccine 3/6/2021 3/27/2021  She increased her bumex as recommended by cardiology.   Pulmonologist - Dr. Thao robb in August    The following portions of the patient's history were reviewed and updated as appropriate: allergies, current medications, past family history, past medical history, past social history, past surgical history and problem list.    Review of Systems    Objective     Vitals:    04/19/21 1114   BP: 128/62   Pulse: 85   Temp: 96.8 °F (36 °C)   SpO2: 94%     Body mass index is 29.16 kg/m².    Orders Only on 04/12/2021   Component Date Value Ref Range Status   • WBC 04/13/2021 13.01* 3.40 - 10.80 10*3/mm3 Final   • RBC 04/13/2021 4.61  3.77 - 5.28 10*6/mm3 Final   • Hemoglobin 04/13/2021 14.9  12.0 - 15.9 g/dL Final   • Hematocrit 04/13/2021 45.9  34.0 - 46.6 % Final   • MCV 04/13/2021 99.6* 79.0 - 97.0 fL Final   • MCH 04/13/2021 32.3  26.6 - 33.0 pg Final   • MCHC 04/13/2021 32.5  31.5 - 35.7 g/dL Final   • RDW 04/13/2021 13.1  12.3 - 15.4 % Final   • Platelets 04/13/2021 298  140 - 450 10*3/mm3 Final   • Neutrophil Rel % 04/13/2021 82.4* 42.7 - 76.0 % Final   • Lymphocyte Rel % 04/13/2021 10.2* 19.6 - 45.3 % Final   • Monocyte Rel % 04/13/2021 5.2  5.0 - 12.0 % Final   • Eosinophil Rel % 04/13/2021 1.2  0.3 - 6.2 % Final   • Basophil Rel % 04/13/2021 0.5  0.0 - 1.5 % Final   • Neutrophils Absolute 04/13/2021 10.71* 1.70 - 7.00 10*3/mm3 Final   • Lymphocytes Absolute 04/13/2021 1.33  0.70  - 3.10 10*3/mm3 Final   • Monocytes Absolute 04/13/2021 0.68  0.10 - 0.90 10*3/mm3 Final   • Eosinophils Absolute 04/13/2021 0.16  0.00 - 0.40 10*3/mm3 Final   • Basophils Absolute 04/13/2021 0.07  0.00 - 0.20 10*3/mm3 Final   • Immature Granulocyte Rel % 04/13/2021 0.5  0.0 - 0.5 % Final   • Immature Grans Absolute 04/13/2021 0.06* 0.00 - 0.05 10*3/mm3 Final   • nRBC 04/13/2021 0.0  0.0 - 0.2 /100 WBC Final   • Glucose 04/13/2021 149* 65 - 99 mg/dL Final   • BUN 04/13/2021 37* 8 - 23 mg/dL Final   • Creatinine 04/13/2021 1.49* 0.57 - 1.00 mg/dL Final   • eGFR Non African Am 04/13/2021 35* >60 mL/min/1.73 Final    Comment: GFR Normal >60  Chronic Kidney Disease <60  Kidney Failure <15     • eGFR  Am 04/13/2021 42* >60 mL/min/1.73 Final   • BUN/Creatinine Ratio 04/13/2021 24.8  7.0 - 25.0 Final   • Sodium 04/13/2021 139  136 - 145 mmol/L Final   • Potassium 04/13/2021 4.9  3.5 - 5.2 mmol/L Final   • Chloride 04/13/2021 105  98 - 107 mmol/L Final   • Total CO2 04/13/2021 24.0  22.0 - 29.0 mmol/L Final   • Calcium 04/13/2021 10.2  8.6 - 10.5 mg/dL Final   • Total Protein 04/13/2021 6.8  6.0 - 8.5 g/dL Final   • Albumin 04/13/2021 4.30  3.50 - 5.20 g/dL Final   • Globulin 04/13/2021 2.5  gm/dL Final   • A/G Ratio 04/13/2021 1.7  g/dL Final   • Total Bilirubin 04/13/2021 0.4  0.0 - 1.2 mg/dL Final   • Alkaline Phosphatase 04/13/2021 140* 39 - 117 U/L Final   • AST (SGOT) 04/13/2021 32  1 - 32 U/L Final   • ALT (SGPT) 04/13/2021 41* 1 - 33 U/L Final   • Total Cholesterol 04/13/2021 147  0 - 200 mg/dL Final    Comment: Cholesterol Reference Ranges  (U.S. Department of Health and Human Services ATP III  Classifications)  Desirable          <200 mg/dL  Borderline High    200-239 mg/dL  High Risk          >240 mg/dL  Triglyceride Reference Ranges  (U.S. Department of Health and Human Services ATP III  Classifications)  Normal           <150 mg/dL  Borderline High  150-199 mg/dL  High             200-499 mg/dL  Very  High        >500 mg/dL  HDL Reference Ranges  (U.S. Department of Health and Human Services ATP III  Classifcations)  Low     <40 mg/dl (major risk factor for CHD)  High    >60 mg/dl ('negative' risk factor for CHD)  LDL Reference Ranges  (U.S. Department of Health and Human Services ATP III  Classifcations)  Optimal          <100 mg/dL  Near Optimal     100-129 mg/dL  Borderline High  130-159 mg/dL  High             160-189 mg/dL  Very High        >189 mg/dL     • Triglycerides 04/13/2021 129  0 - 150 mg/dL Final   • HDL Cholesterol 04/13/2021 30* 40 - 60 mg/dL Final   • VLDL Cholesterol Honorio 04/13/2021 23  5 - 40 mg/dL Final   • LDL Chol Calc ([facility]) 04/13/2021 94  0 - 100 mg/dL Final   • Chol/HDL Ratio 04/13/2021 4.90   Final   • Hemoglobin A1C 04/13/2021 7.50* 4.80 - 5.60 % Final    Comment: Hemoglobin A1C Ranges:  Increased Risk for Diabetes  5.7% to 6.4%  Diabetes                     >= 6.5%  Diabetic Goal                < 7.0%     • Microalbumin, Urine 04/13/2021 430.3  Not Estab. ug/mL Final    Comment: Results confirmed on  dilution.       Reviewed labs with patient.     Physical Exam  Vitals and nursing note reviewed.   Constitutional:       Appearance: Normal appearance. She is well-developed.   Cardiovascular:      Rate and Rhythm: Normal rate and regular rhythm.      Heart sounds: Normal heart sounds.   Pulmonary:      Effort: Pulmonary effort is normal.      Breath sounds: Normal breath sounds.   Musculoskeletal:      Right lower leg: No edema.      Left lower leg: No edema.   Skin:     General: Skin is warm and dry.   Neurological:      Mental Status: She is alert.   Psychiatric:         Speech: Speech normal.         Behavior: Behavior normal.         Thought Content: Thought content normal.         Assessment/Plan   Diagnoses and all orders for this visit:    1. Type 2 diabetes mellitus with hyperglycemia, without long-term current use of insulin (CMS/[facility]) (Primary)    2. Dyspnea on exertion  -     XR  Chest PA & Lateral; Future    3. Renal insufficiency  -     Basic Metabolic Panel    4. Hyperlipidemia, unspecified hyperlipidemia type    DM - No changes to meds at this time.  Patient declines to take any other medication.  With her renal insufficiency, I do not want to increase metformin.    Dyspnea - patient will follow up with cardiology.  Since her white count was elevated, will check a chest x-ray. Patient may also need to see her pulmonologist    Renal insufficiency - will recheck BMP. Will likely need a referral to nephrology    No other med changes at this time         Current Outpatient Medications:   •  acetaZOLAMIDE (DIAMOX) 250 MG tablet, Take 1 tablet by mouth 2 (Two) Times a Day., Disp: , Rfl:   •  albuterol (PROVENTIL HFA;VENTOLIN HFA) 108 (90 BASE) MCG/ACT inhaler, Inhale 2 puffs Every 4 (Four) Hours As Needed for Wheezing., Disp: 18 g, Rfl: 3  •  brimonidine (ALPHAGAN) 0.15 % ophthalmic solution, Administer 1 drop to both eyes 2 (Two) Times a Day., Disp: , Rfl:   •  bumetanide (BUMEX) 1 MG tablet, Take 2 mg by mouth Daily., Disp: , Rfl:   •  CARTIA  MG 24 hr capsule, Take 1 capsule by mouth Daily., Disp: , Rfl:   •  Cholecalciferol (VITAMIN D3) 125 MCG (5000 UT) capsule capsule, Take 5,000 Units by mouth Daily., Disp: , Rfl:   •  ciclopirox (LOPROX) 0.77 % cream, Apply 1 application topically to the appropriate area as directed 3 (Three) Times a Day., Disp: , Rfl:   •  dorzolamide (TRUSOPT) 2 % ophthalmic solution, Apply 1 drop to eye(s) as directed by provider 2 (two) times a day., Disp: , Rfl:   •  ELIQUIS 5 MG tablet tablet, Take 1 tablet by mouth 2 (Two) Times a Day., Disp: , Rfl:   •  glucose monitor monitoring kit, 1 each Daily. Use to check glucose daily, Disp: 1 each, Rfl: 0  •  metFORMIN ER (GLUCOPHAGE-XR) 500 MG 24 hr tablet, TAKE ONE TABLET BY MOUTH TWICE A DAY, Disp: 180 tablet, Rfl: 2  •  metoprolol succinate XL (TOPROL-XL) 50 MG 24 hr tablet, Take 1 tablet by mouth Daily.,  Disp: , Rfl:   •  Multiple Vitamin (MULTI VITAMIN DAILY PO), Take 1 tablet by mouth daily., Disp: , Rfl:   •  pilocarpine (PILOCAR) 1 % ophthalmic solution, Administer 1 drop to both eyes 2 (Two) Times a Day., Disp: , Rfl:   •  spironolactone (ALDACTONE) 25 MG tablet, Take 1 tablet by mouth Daily., Disp: , Rfl:   •  TRELEGY ELLIPTA 100-62.5-25 MCG/INH aerosol powder , Inhale 1 puff Daily., Disp: , Rfl:

## 2021-04-20 DIAGNOSIS — N28.9 RENAL INSUFFICIENCY: Primary | ICD-10-CM

## 2021-04-20 LAB
BUN SERPL-MCNC: 28 MG/DL (ref 8–23)
BUN/CREAT SERPL: 21.5 (ref 7–25)
CALCIUM SERPL-MCNC: 10.9 MG/DL (ref 8.6–10.5)
CHLORIDE SERPL-SCNC: 98 MMOL/L (ref 98–107)
CO2 SERPL-SCNC: 27.4 MMOL/L (ref 22–29)
CREAT SERPL-MCNC: 1.3 MG/DL (ref 0.57–1)
GLUCOSE SERPL-MCNC: 165 MG/DL (ref 65–99)
POTASSIUM SERPL-SCNC: 4 MMOL/L (ref 3.5–5.2)
SODIUM SERPL-SCNC: 139 MMOL/L (ref 136–145)

## 2021-04-27 ENCOUNTER — APPOINTMENT (OUTPATIENT)
Dept: GENERAL RADIOLOGY | Facility: HOSPITAL | Age: 66
End: 2021-04-27

## 2021-04-27 ENCOUNTER — TELEPHONE (OUTPATIENT)
Dept: INTERNAL MEDICINE | Facility: CLINIC | Age: 66
End: 2021-04-27

## 2021-04-27 ENCOUNTER — HOSPITAL ENCOUNTER (INPATIENT)
Facility: HOSPITAL | Age: 66
LOS: 2 days | Discharge: HOME | End: 2021-04-30
Attending: EMERGENCY MEDICINE | Admitting: INTERNAL MEDICINE

## 2021-04-27 ENCOUNTER — APPOINTMENT (OUTPATIENT)
Dept: CT IMAGING | Facility: HOSPITAL | Age: 66
End: 2021-04-27

## 2021-04-27 DIAGNOSIS — I48.92 ATRIAL FLUTTER, UNSPECIFIED TYPE (HCC): Primary | ICD-10-CM

## 2021-04-27 DIAGNOSIS — I95.9 HYPOTENSION, UNSPECIFIED HYPOTENSION TYPE: ICD-10-CM

## 2021-04-27 LAB
ALBUMIN SERPL-MCNC: 4.2 G/DL (ref 3.5–5.2)
ALBUMIN/GLOB SERPL: 1.4 G/DL
ALP SERPL-CCNC: 130 U/L (ref 39–117)
ALT SERPL W P-5'-P-CCNC: 52 U/L (ref 1–33)
ANION GAP SERPL CALCULATED.3IONS-SCNC: 15.6 MMOL/L (ref 5–15)
AST SERPL-CCNC: 47 U/L (ref 1–32)
B PARAPERT DNA SPEC QL NAA+PROBE: NOT DETECTED
B PERT DNA SPEC QL NAA+PROBE: NOT DETECTED
BACTERIA UR QL AUTO: ABNORMAL /HPF
BASOPHILS # BLD AUTO: 0.07 10*3/MM3 (ref 0–0.2)
BASOPHILS NFR BLD AUTO: 0.7 % (ref 0–1.5)
BILIRUB SERPL-MCNC: 0.5 MG/DL (ref 0–1.2)
BILIRUB UR QL STRIP: NEGATIVE
BUN SERPL-MCNC: 39 MG/DL (ref 8–23)
BUN/CREAT SERPL: 30 (ref 7–25)
C PNEUM DNA NPH QL NAA+NON-PROBE: NOT DETECTED
CALCIUM SPEC-SCNC: 9.5 MG/DL (ref 8.6–10.5)
CHLORIDE SERPL-SCNC: 103 MMOL/L (ref 98–107)
CLARITY UR: CLEAR
CO2 SERPL-SCNC: 18.4 MMOL/L (ref 22–29)
COLOR UR: YELLOW
CREAT SERPL-MCNC: 1.3 MG/DL (ref 0.57–1)
DEPRECATED RDW RBC AUTO: 46.9 FL (ref 37–54)
EOSINOPHIL # BLD AUTO: 0.12 10*3/MM3 (ref 0–0.4)
EOSINOPHIL NFR BLD AUTO: 1.3 % (ref 0.3–6.2)
ERYTHROCYTE [DISTWIDTH] IN BLOOD BY AUTOMATED COUNT: 13.6 % (ref 12.3–15.4)
FLUAV SUBTYP SPEC NAA+PROBE: NOT DETECTED
FLUBV RNA ISLT QL NAA+PROBE: NOT DETECTED
GFR SERPL CREATININE-BSD FRML MDRD: 41 ML/MIN/1.73
GLOBULIN UR ELPH-MCNC: 2.9 GM/DL
GLUCOSE SERPL-MCNC: 95 MG/DL (ref 65–99)
GLUCOSE UR STRIP-MCNC: NEGATIVE MG/DL
HADV DNA SPEC NAA+PROBE: NOT DETECTED
HCOV 229E RNA SPEC QL NAA+PROBE: NOT DETECTED
HCOV HKU1 RNA SPEC QL NAA+PROBE: NOT DETECTED
HCOV NL63 RNA SPEC QL NAA+PROBE: NOT DETECTED
HCOV OC43 RNA SPEC QL NAA+PROBE: NOT DETECTED
HCT VFR BLD AUTO: 41.6 % (ref 34–46.6)
HGB BLD-MCNC: 14.3 G/DL (ref 12–15.9)
HGB UR QL STRIP.AUTO: NEGATIVE
HMPV RNA NPH QL NAA+NON-PROBE: NOT DETECTED
HOLD SPECIMEN: NORMAL
HOLD SPECIMEN: NORMAL
HPIV1 RNA SPEC QL NAA+PROBE: NOT DETECTED
HPIV2 RNA SPEC QL NAA+PROBE: NOT DETECTED
HPIV3 RNA NPH QL NAA+PROBE: NOT DETECTED
HPIV4 P GENE NPH QL NAA+PROBE: NOT DETECTED
HYALINE CASTS UR QL AUTO: ABNORMAL /LPF
IMM GRANULOCYTES # BLD AUTO: 0.04 10*3/MM3 (ref 0–0.05)
IMM GRANULOCYTES NFR BLD AUTO: 0.4 % (ref 0–0.5)
KETONES UR QL STRIP: NEGATIVE
LEUKOCYTE ESTERASE UR QL STRIP.AUTO: ABNORMAL
LIPASE SERPL-CCNC: 63 U/L (ref 13–60)
LYMPHOCYTES # BLD AUTO: 1.73 10*3/MM3 (ref 0.7–3.1)
LYMPHOCYTES NFR BLD AUTO: 18.2 % (ref 19.6–45.3)
M PNEUMO IGG SER IA-ACNC: NOT DETECTED
MCH RBC QN AUTO: 32.9 PG (ref 26.6–33)
MCHC RBC AUTO-ENTMCNC: 34.4 G/DL (ref 31.5–35.7)
MCV RBC AUTO: 95.9 FL (ref 79–97)
MONOCYTES # BLD AUTO: 0.65 10*3/MM3 (ref 0.1–0.9)
MONOCYTES NFR BLD AUTO: 6.9 % (ref 5–12)
NEUTROPHILS NFR BLD AUTO: 6.87 10*3/MM3 (ref 1.7–7)
NEUTROPHILS NFR BLD AUTO: 72.5 % (ref 42.7–76)
NITRITE UR QL STRIP: NEGATIVE
NRBC BLD AUTO-RTO: 0.1 /100 WBC (ref 0–0.2)
NT-PROBNP SERPL-MCNC: 9346 PG/ML (ref 0–900)
PH UR STRIP.AUTO: <=5 [PH] (ref 5–8)
PLATELET # BLD AUTO: 284 10*3/MM3 (ref 140–450)
PMV BLD AUTO: 10.1 FL (ref 6–12)
POTASSIUM SERPL-SCNC: 3.6 MMOL/L (ref 3.5–5.2)
PROCALCITONIN SERPL-MCNC: 0.2 NG/ML (ref 0–0.25)
PROT SERPL-MCNC: 7.1 G/DL (ref 6–8.5)
PROT UR QL STRIP: NEGATIVE
RBC # BLD AUTO: 4.34 10*6/MM3 (ref 3.77–5.28)
RBC # UR: ABNORMAL /HPF
REF LAB TEST METHOD: ABNORMAL
RHINOVIRUS RNA SPEC NAA+PROBE: NOT DETECTED
RSV RNA NPH QL NAA+NON-PROBE: NOT DETECTED
SARS-COV-2 RNA NPH QL NAA+NON-PROBE: NOT DETECTED
SODIUM SERPL-SCNC: 137 MMOL/L (ref 136–145)
SP GR UR STRIP: 1.01 (ref 1–1.03)
SQUAMOUS #/AREA URNS HPF: ABNORMAL /HPF
TROPONIN T SERPL-MCNC: 0.02 NG/ML (ref 0–0.03)
UROBILINOGEN UR QL STRIP: ABNORMAL
WBC # BLD AUTO: 9.48 10*3/MM3 (ref 3.4–10.8)
WBC UR QL AUTO: ABNORMAL /HPF
WHOLE BLOOD HOLD SPECIMEN: NORMAL
WHOLE BLOOD HOLD SPECIMEN: NORMAL

## 2021-04-27 PROCEDURE — 84145 PROCALCITONIN (PCT): CPT | Performed by: PHYSICIAN ASSISTANT

## 2021-04-27 PROCEDURE — 85025 COMPLETE CBC W/AUTO DIFF WBC: CPT

## 2021-04-27 PROCEDURE — 74176 CT ABD & PELVIS W/O CONTRAST: CPT

## 2021-04-27 PROCEDURE — 93005 ELECTROCARDIOGRAM TRACING: CPT | Performed by: EMERGENCY MEDICINE

## 2021-04-27 PROCEDURE — 81001 URINALYSIS AUTO W/SCOPE: CPT | Performed by: PHYSICIAN ASSISTANT

## 2021-04-27 PROCEDURE — 93005 ELECTROCARDIOGRAM TRACING: CPT

## 2021-04-27 PROCEDURE — 71046 X-RAY EXAM CHEST 2 VIEWS: CPT

## 2021-04-27 PROCEDURE — 93005 ELECTROCARDIOGRAM TRACING: CPT | Performed by: PHYSICIAN ASSISTANT

## 2021-04-27 PROCEDURE — 93010 ELECTROCARDIOGRAM REPORT: CPT | Performed by: INTERNAL MEDICINE

## 2021-04-27 PROCEDURE — 99285 EMERGENCY DEPT VISIT HI MDM: CPT

## 2021-04-27 PROCEDURE — 0202U NFCT DS 22 TRGT SARS-COV-2: CPT | Performed by: PHYSICIAN ASSISTANT

## 2021-04-27 PROCEDURE — 84484 ASSAY OF TROPONIN QUANT: CPT

## 2021-04-27 PROCEDURE — 80053 COMPREHEN METABOLIC PANEL: CPT

## 2021-04-27 PROCEDURE — 83690 ASSAY OF LIPASE: CPT | Performed by: PHYSICIAN ASSISTANT

## 2021-04-27 PROCEDURE — 83880 ASSAY OF NATRIURETIC PEPTIDE: CPT

## 2021-04-27 RX ORDER — SODIUM CHLORIDE 0.9 % (FLUSH) 0.9 %
10 SYRINGE (ML) INJECTION AS NEEDED
Status: DISCONTINUED | OUTPATIENT
Start: 2021-04-27 | End: 2021-04-30 | Stop reason: HOSPADM

## 2021-04-27 NOTE — TELEPHONE ENCOUNTER
Spoke with patient. She continues with dyspnea, loss of appetite, dehydration & constipation. She is currently on 2-3 liters of O2 all day. She is feeling like something internally is wrong. I let her know that she should definitely be taken to the hospital if her SOA has increased. She is aware to make a hospital follow up with [facility] after discharge. Pt verbally understood.

## 2021-04-27 NOTE — TELEPHONE ENCOUNTER
Caller: Laine Thomas    Relationship: Self    Best call back number: [phone] (H)    What is the best time to reach you: ANYTIME    Who are you requesting to speak with (clinical staff, provider,  specific staff member): CAROLINA    Do you know the name of the person who called:     What was the call regarding: PATIENT CALLED AND STATES THAT SHE IS FEELING WORSE AND WORSE EVERYDAY. PATIENT STATES THAT RENAL CONSULT HAS NOT CONTACTED HER AS OF YET, AND SHE NEEDS TO SPEAK TO ASAP.    Do you require a callback: YES      THANKS

## 2021-04-28 ENCOUNTER — APPOINTMENT (OUTPATIENT)
Dept: CARDIOLOGY | Facility: HOSPITAL | Age: 66
End: 2021-04-28

## 2021-04-28 PROBLEM — I48.92 ATRIAL FLUTTER (HCC): Status: ACTIVE | Noted: 2021-04-28

## 2021-04-28 LAB
BH CV LOWER VASCULAR LEFT COMMON FEMORAL AUGMENT: NORMAL
BH CV LOWER VASCULAR LEFT COMMON FEMORAL COMPETENT: NORMAL
BH CV LOWER VASCULAR LEFT COMMON FEMORAL COMPRESS: NORMAL
BH CV LOWER VASCULAR LEFT COMMON FEMORAL PHASIC: NORMAL
BH CV LOWER VASCULAR LEFT COMMON FEMORAL SPONT: NORMAL
BH CV LOWER VASCULAR LEFT DISTAL FEMORAL COMPRESS: NORMAL
BH CV LOWER VASCULAR LEFT GASTRONEMIUS COMPRESS: NORMAL
BH CV LOWER VASCULAR LEFT GREATER SAPH AK COMPRESS: NORMAL
BH CV LOWER VASCULAR LEFT GREATER SAPH BK COMPRESS: NORMAL
BH CV LOWER VASCULAR LEFT LESSER SAPH COMPRESS: NORMAL
BH CV LOWER VASCULAR LEFT MID FEMORAL AUGMENT: NORMAL
BH CV LOWER VASCULAR LEFT MID FEMORAL COMPETENT: NORMAL
BH CV LOWER VASCULAR LEFT MID FEMORAL COMPRESS: NORMAL
BH CV LOWER VASCULAR LEFT MID FEMORAL PHASIC: NORMAL
BH CV LOWER VASCULAR LEFT MID FEMORAL SPONT: NORMAL
BH CV LOWER VASCULAR LEFT PERONEAL COMPRESS: NORMAL
BH CV LOWER VASCULAR LEFT POPLITEAL AUGMENT: NORMAL
BH CV LOWER VASCULAR LEFT POPLITEAL COMPETENT: NORMAL
BH CV LOWER VASCULAR LEFT POPLITEAL COMPRESS: NORMAL
BH CV LOWER VASCULAR LEFT POPLITEAL PHASIC: NORMAL
BH CV LOWER VASCULAR LEFT POPLITEAL SPONT: NORMAL
BH CV LOWER VASCULAR LEFT POSTERIOR TIBIAL COMPRESS: NORMAL
BH CV LOWER VASCULAR LEFT PROFUNDA FEMORAL COMPRESS: NORMAL
BH CV LOWER VASCULAR LEFT PROXIMAL FEMORAL COMPRESS: NORMAL
BH CV LOWER VASCULAR LEFT SAPHENOFEMORAL JUNCTION COMPRESS: NORMAL
BH CV LOWER VASCULAR RIGHT COMMON FEMORAL AUGMENT: NORMAL
BH CV LOWER VASCULAR RIGHT COMMON FEMORAL COMPETENT: NORMAL
BH CV LOWER VASCULAR RIGHT COMMON FEMORAL COMPRESS: NORMAL
BH CV LOWER VASCULAR RIGHT COMMON FEMORAL PHASIC: NORMAL
BH CV LOWER VASCULAR RIGHT COMMON FEMORAL SPONT: NORMAL
BH CV LOWER VASCULAR RIGHT DISTAL FEMORAL COMPRESS: NORMAL
BH CV LOWER VASCULAR RIGHT GASTRONEMIUS COMPRESS: NORMAL
BH CV LOWER VASCULAR RIGHT GREATER SAPH AK COMPRESS: NORMAL
BH CV LOWER VASCULAR RIGHT GREATER SAPH BK COMPRESS: NORMAL
BH CV LOWER VASCULAR RIGHT LESSER SAPH COMPRESS: NORMAL
BH CV LOWER VASCULAR RIGHT MID FEMORAL AUGMENT: NORMAL
BH CV LOWER VASCULAR RIGHT MID FEMORAL COMPETENT: NORMAL
BH CV LOWER VASCULAR RIGHT MID FEMORAL COMPRESS: NORMAL
BH CV LOWER VASCULAR RIGHT MID FEMORAL PHASIC: NORMAL
BH CV LOWER VASCULAR RIGHT MID FEMORAL SPONT: NORMAL
BH CV LOWER VASCULAR RIGHT PERONEAL COMPRESS: NORMAL
BH CV LOWER VASCULAR RIGHT POPLITEAL AUGMENT: NORMAL
BH CV LOWER VASCULAR RIGHT POPLITEAL COMPETENT: NORMAL
BH CV LOWER VASCULAR RIGHT POPLITEAL COMPRESS: NORMAL
BH CV LOWER VASCULAR RIGHT POPLITEAL PHASIC: NORMAL
BH CV LOWER VASCULAR RIGHT POPLITEAL SPONT: NORMAL
BH CV LOWER VASCULAR RIGHT POSTERIOR TIBIAL COMPRESS: NORMAL
BH CV LOWER VASCULAR RIGHT PROFUNDA FEMORAL COMPRESS: NORMAL
BH CV LOWER VASCULAR RIGHT PROXIMAL FEMORAL COMPRESS: NORMAL
BH CV LOWER VASCULAR RIGHT SAPHENOFEMORAL JUNCTION COMPRESS: NORMAL
CK SERPL-CCNC: 55 U/L (ref 20–180)
CK SERPL-CCNC: 57 U/L (ref 20–180)
D DIMER PPP FEU-MCNC: 1.54 MCGFEU/ML (ref 0–0.49)
MAXIMAL PREDICTED HEART RATE: 153 BPM
QT INTERVAL: 263 MS
QT INTERVAL: 325 MS
QT INTERVAL: 384 MS
STRESS TARGET HR: 130 BPM
TROPONIN T SERPL-MCNC: 0.01 NG/ML (ref 0–0.03)
TROPONIN T SERPL-MCNC: <0.01 NG/ML (ref 0–0.03)

## 2021-04-28 PROCEDURE — 93010 ELECTROCARDIOGRAM REPORT: CPT | Performed by: INTERNAL MEDICINE

## 2021-04-28 PROCEDURE — 85379 FIBRIN DEGRADATION QUANT: CPT | Performed by: NURSE PRACTITIONER

## 2021-04-28 PROCEDURE — 94640 AIRWAY INHALATION TREATMENT: CPT

## 2021-04-28 PROCEDURE — 93005 ELECTROCARDIOGRAM TRACING: CPT | Performed by: INTERNAL MEDICINE

## 2021-04-28 PROCEDURE — 82550 ASSAY OF CK (CPK): CPT | Performed by: NURSE PRACTITIONER

## 2021-04-28 PROCEDURE — 93306 TTE W/DOPPLER COMPLETE: CPT

## 2021-04-28 PROCEDURE — 93005 ELECTROCARDIOGRAM TRACING: CPT | Performed by: NURSE PRACTITIONER

## 2021-04-28 PROCEDURE — 94799 UNLISTED PULMONARY SVC/PX: CPT

## 2021-04-28 PROCEDURE — 93970 EXTREMITY STUDY: CPT

## 2021-04-28 PROCEDURE — 84484 ASSAY OF TROPONIN QUANT: CPT | Performed by: NURSE PRACTITIONER

## 2021-04-28 PROCEDURE — 25010000002 PERFLUTREN (DEFINITY) 8.476 MG IN SODIUM CHLORIDE (PF) 0.9 % 10 ML INJECTION: Performed by: NURSE PRACTITIONER

## 2021-04-28 RX ORDER — METOPROLOL SUCCINATE 50 MG/1
50 TABLET, EXTENDED RELEASE ORAL DAILY
Status: DISCONTINUED | OUTPATIENT
Start: 2021-04-28 | End: 2021-04-30 | Stop reason: HOSPADM

## 2021-04-28 RX ORDER — SPIRONOLACTONE 25 MG/1
25 TABLET ORAL DAILY
Status: DISCONTINUED | OUTPATIENT
Start: 2021-04-28 | End: 2021-04-30 | Stop reason: HOSPADM

## 2021-04-28 RX ORDER — DORZOLAMIDE HCL 20 MG/ML
1 SOLUTION/ DROPS OPHTHALMIC 2 TIMES DAILY
Status: DISCONTINUED | OUTPATIENT
Start: 2021-04-28 | End: 2021-04-30 | Stop reason: HOSPADM

## 2021-04-28 RX ORDER — ALBUTEROL SULFATE 2.5 MG/3ML
2.5 SOLUTION RESPIRATORY (INHALATION)
Status: DISCONTINUED | OUTPATIENT
Start: 2021-04-28 | End: 2021-04-30 | Stop reason: HOSPADM

## 2021-04-28 RX ORDER — ACETAZOLAMIDE 250 MG/1
250 TABLET ORAL 2 TIMES DAILY
Status: DISCONTINUED | OUTPATIENT
Start: 2021-04-28 | End: 2021-04-30 | Stop reason: HOSPADM

## 2021-04-28 RX ORDER — BRIMONIDINE TARTRATE 0.15 %
1 DROPS OPHTHALMIC (EYE) 2 TIMES DAILY
Status: DISCONTINUED | OUTPATIENT
Start: 2021-04-28 | End: 2021-04-30 | Stop reason: HOSPADM

## 2021-04-28 RX ORDER — PILOCARPINE HYDROCHLORIDE 10 MG/ML
1 SOLUTION/ DROPS OPHTHALMIC 2 TIMES DAILY
Status: DISCONTINUED | OUTPATIENT
Start: 2021-04-28 | End: 2021-04-30 | Stop reason: HOSPADM

## 2021-04-28 RX ORDER — AMIODARONE HYDROCHLORIDE 50 MG/ML
150 INJECTION, SOLUTION INTRAVENOUS ONCE
Status: DISCONTINUED | OUTPATIENT
Start: 2021-04-28 | End: 2021-04-28

## 2021-04-28 RX ORDER — IBUPROFEN 200 MG
200 TABLET ORAL EVERY 6 HOURS PRN
COMMUNITY

## 2021-04-28 RX ORDER — ACETAMINOPHEN 325 MG/1
650 TABLET ORAL EVERY 6 HOURS PRN
Status: DISCONTINUED | OUTPATIENT
Start: 2021-04-28 | End: 2021-04-30 | Stop reason: HOSPADM

## 2021-04-28 RX ORDER — BUMETANIDE 2 MG/1
2 TABLET ORAL DAILY
Status: DISCONTINUED | OUTPATIENT
Start: 2021-04-28 | End: 2021-04-30 | Stop reason: HOSPADM

## 2021-04-28 RX ORDER — MELATONIN
5000 DAILY
Status: DISCONTINUED | OUTPATIENT
Start: 2021-04-28 | End: 2021-04-30 | Stop reason: HOSPADM

## 2021-04-28 RX ORDER — BUDESONIDE AND FORMOTEROL FUMARATE DIHYDRATE 160; 4.5 UG/1; UG/1
2 AEROSOL RESPIRATORY (INHALATION)
Status: DISCONTINUED | OUTPATIENT
Start: 2021-04-28 | End: 2021-04-30 | Stop reason: HOSPADM

## 2021-04-28 RX ORDER — DIPHENOXYLATE HYDROCHLORIDE AND ATROPINE SULFATE 2.5; .025 MG/1; MG/1
1 TABLET ORAL DAILY
Status: DISCONTINUED | OUTPATIENT
Start: 2021-04-28 | End: 2021-04-30 | Stop reason: HOSPADM

## 2021-04-28 RX ORDER — ONDANSETRON 2 MG/ML
4 INJECTION INTRAMUSCULAR; INTRAVENOUS EVERY 6 HOURS PRN
Status: DISCONTINUED | OUTPATIENT
Start: 2021-04-28 | End: 2021-04-30 | Stop reason: HOSPADM

## 2021-04-28 RX ADMIN — BRIMONIDINE TARTRATE 1 DROP: 1.5 SOLUTION OPHTHALMIC at 11:06

## 2021-04-28 RX ADMIN — ACETAZOLAMIDE 250 MG: 250 TABLET ORAL at 20:41

## 2021-04-28 RX ADMIN — TIOTROPIUM BROMIDE INHALATION SPRAY 2 PUFF: 3.12 SPRAY, METERED RESPIRATORY (INHALATION) at 15:35

## 2021-04-28 RX ADMIN — BUDESONIDE AND FORMOTEROL FUMARATE DIHYDRATE 2 PUFF: 160; 4.5 AEROSOL RESPIRATORY (INHALATION) at 15:36

## 2021-04-28 RX ADMIN — PILOCARPINE HYDROCHLORIDE 1 DROP: 10 SOLUTION/ DROPS OPHTHALMIC at 11:06

## 2021-04-28 RX ADMIN — BUDESONIDE AND FORMOTEROL FUMARATE DIHYDRATE 2 PUFF: 160; 4.5 AEROSOL RESPIRATORY (INHALATION) at 21:30

## 2021-04-28 RX ADMIN — DORZOLAMIDE HYDROCHLORIDE 1 DROP: 20 SOLUTION/ DROPS OPHTHALMIC at 20:41

## 2021-04-28 RX ADMIN — PILOCARPINE HYDROCHLORIDE 1 DROP: 10 SOLUTION/ DROPS OPHTHALMIC at 20:41

## 2021-04-28 RX ADMIN — ACETAZOLAMIDE 250 MG: 250 TABLET ORAL at 14:30

## 2021-04-28 RX ADMIN — PERFLUTREN 2 ML: 6.52 INJECTION, SUSPENSION INTRAVENOUS at 11:30

## 2021-04-28 RX ADMIN — BUMETANIDE 2 MG: 2 TABLET ORAL at 11:06

## 2021-04-28 RX ADMIN — APIXABAN 5 MG: 5 TABLET, FILM COATED ORAL at 11:06

## 2021-04-28 RX ADMIN — SPIRONOLACTONE 25 MG: 25 TABLET ORAL at 11:06

## 2021-04-28 RX ADMIN — APIXABAN 5 MG: 5 TABLET, FILM COATED ORAL at 20:41

## 2021-04-28 RX ADMIN — Medication 5000 UNITS: at 11:06

## 2021-04-28 RX ADMIN — DILTIAZEM HYDROCHLORIDE 300 MG: 180 CAPSULE, COATED, EXTENDED RELEASE ORAL at 11:06

## 2021-04-28 RX ADMIN — BRIMONIDINE TARTRATE 1 DROP: 1.5 SOLUTION OPHTHALMIC at 20:39

## 2021-04-28 RX ADMIN — DORZOLAMIDE HYDROCHLORIDE 1 DROP: 20 SOLUTION/ DROPS OPHTHALMIC at 11:06

## 2021-04-28 RX ADMIN — Medication 1 TABLET: at 11:06

## 2021-04-29 ENCOUNTER — APPOINTMENT (OUTPATIENT)
Dept: NUCLEAR MEDICINE | Facility: HOSPITAL | Age: 66
End: 2021-04-29

## 2021-04-29 ENCOUNTER — APPOINTMENT (OUTPATIENT)
Dept: CT IMAGING | Facility: HOSPITAL | Age: 66
End: 2021-04-29

## 2021-04-29 LAB
AORTIC DIMENSIONLESS INDEX: 0.9 (DI)
BH CV ECHO MEAS - AO MAX PG: 4 MMHG
BH CV ECHO MEAS - AO MEAN PG (FULL): 1 MMHG
BH CV ECHO MEAS - AO MEAN PG: 2 MMHG
BH CV ECHO MEAS - AO ROOT AREA (BSA CORRECTED): 1.8
BH CV ECHO MEAS - AO ROOT AREA: 7.1 CM^2
BH CV ECHO MEAS - AO ROOT DIAM: 3 CM
BH CV ECHO MEAS - AO V2 MAX: 103 CM/SEC
BH CV ECHO MEAS - AO V2 MEAN: 68.4 CM/SEC
BH CV ECHO MEAS - AO V2 VTI: 16.5 CM
BH CV ECHO MEAS - AVA(I,A): 2.8 CM^2
BH CV ECHO MEAS - AVA(I,D): 2.8 CM^2
BH CV ECHO MEAS - BSA(HAYCOCK): 1.7 M^2
BH CV ECHO MEAS - BSA: 1.7 M^2
BH CV ECHO MEAS - BZI_BMI: 29.3 KILOGRAMS/M^2
BH CV ECHO MEAS - BZI_METRIC_HEIGHT: 152.4 CM
BH CV ECHO MEAS - BZI_METRIC_WEIGHT: 68 KG
BH CV ECHO MEAS - EDV(CUBED): 24.4 ML
BH CV ECHO MEAS - EDV(MOD-SP2): 59 ML
BH CV ECHO MEAS - EDV(MOD-SP4): 35 ML
BH CV ECHO MEAS - EDV(TEICH): 32.2 ML
BH CV ECHO MEAS - EF(CUBED): 67.2 %
BH CV ECHO MEAS - EF(MOD-BP): 55 %
BH CV ECHO MEAS - EF(MOD-SP2): 64.4 %
BH CV ECHO MEAS - EF(MOD-SP4): 45.7 %
BH CV ECHO MEAS - EF(TEICH): 60.5 %
BH CV ECHO MEAS - ESV(CUBED): 8 ML
BH CV ECHO MEAS - ESV(MOD-SP2): 21 ML
BH CV ECHO MEAS - ESV(MOD-SP4): 19 ML
BH CV ECHO MEAS - ESV(TEICH): 12.7 ML
BH CV ECHO MEAS - FS: 31 %
BH CV ECHO MEAS - IVS/LVPW: 1.1
BH CV ECHO MEAS - IVSD: 1 CM
BH CV ECHO MEAS - LAT PEAK E' VEL: 7 CM/SEC
BH CV ECHO MEAS - LV DIASTOLIC VOL/BSA (35-75): 21.2 ML/M^2
BH CV ECHO MEAS - LV MASS(C)D: 72.3 GRAMS
BH CV ECHO MEAS - LV MASS(C)DI: 43.8 GRAMS/M^2
BH CV ECHO MEAS - LV MAX PG: 3 MMHG
BH CV ECHO MEAS - LV MEAN PG: 1 MMHG
BH CV ECHO MEAS - LV SYSTOLIC VOL/BSA (12-30): 11.5 ML/M^2
BH CV ECHO MEAS - LV V1 MAX: 91 CM/SEC
BH CV ECHO MEAS - LV V1 MEAN: 47.2 CM/SEC
BH CV ECHO MEAS - LV V1 VTI: 14.5 CM
BH CV ECHO MEAS - LVIDD: 2.9 CM
BH CV ECHO MEAS - LVIDS: 2 CM
BH CV ECHO MEAS - LVLD AP2: 6 CM
BH CV ECHO MEAS - LVLD AP4: 5.5 CM
BH CV ECHO MEAS - LVLS AP2: 4.8 CM
BH CV ECHO MEAS - LVLS AP4: 5 CM
BH CV ECHO MEAS - LVOT AREA (M): 3.1 CM^2
BH CV ECHO MEAS - LVOT AREA: 3.1 CM^2
BH CV ECHO MEAS - LVOT DIAM: 2 CM
BH CV ECHO MEAS - LVPWD: 0.9 CM
BH CV ECHO MEAS - MED PEAK E' VEL: 6 CM/SEC
BH CV ECHO MEAS - MV A DUR: 0.09 SEC
BH CV ECHO MEAS - MV A MAX VEL: 96.7 CM/SEC
BH CV ECHO MEAS - MV DEC SLOPE: 194 CM/SEC^2
BH CV ECHO MEAS - MV DEC TIME: 243 SEC
BH CV ECHO MEAS - MV E MAX VEL: 50.3 CM/SEC
BH CV ECHO MEAS - MV E/A: 0.52
BH CV ECHO MEAS - MV MEAN PG: 2 MMHG
BH CV ECHO MEAS - MV P1/2T MAX VEL: 58.5 CM/SEC
BH CV ECHO MEAS - MV P1/2T: 88.3 MSEC
BH CV ECHO MEAS - MV V2 MEAN: 62.9 CM/SEC
BH CV ECHO MEAS - MV V2 VTI: 19.9 CM
BH CV ECHO MEAS - MVA P1/2T LCG: 3.8 CM^2
BH CV ECHO MEAS - MVA(P1/2T): 2.5 CM^2
BH CV ECHO MEAS - MVA(VTI): 2.3 CM^2
BH CV ECHO MEAS - PA ACC SLOPE: 1051 CM/SEC^2
BH CV ECHO MEAS - PA ACC TIME: 0.08 SEC
BH CV ECHO MEAS - PA MAX PG: 2.7 MMHG
BH CV ECHO MEAS - PA PR(ACCEL): 44.4 MMHG
BH CV ECHO MEAS - PA V2 MAX: 81.4 CM/SEC
BH CV ECHO MEAS - RAP SYSTOLE: 3 MMHG
BH CV ECHO MEAS - RV MEAN PG: 0 MMHG
BH CV ECHO MEAS - RV V1 MEAN: 24.4 CM/SEC
BH CV ECHO MEAS - RV V1 VTI: 8 CM
BH CV ECHO MEAS - RVSP: 84 MMHG
BH CV ECHO MEAS - SI(AO): 70.6 ML/M^2
BH CV ECHO MEAS - SI(CUBED): 9.9 ML/M^2
BH CV ECHO MEAS - SI(LVOT): 27.6 ML/M^2
BH CV ECHO MEAS - SI(MOD-SP2): 23 ML/M^2
BH CV ECHO MEAS - SI(MOD-SP4): 9.7 ML/M^2
BH CV ECHO MEAS - SI(TEICH): 11.8 ML/M^2
BH CV ECHO MEAS - SV(AO): 116.6 ML
BH CV ECHO MEAS - SV(CUBED): 16.4 ML
BH CV ECHO MEAS - SV(LVOT): 45.6 ML
BH CV ECHO MEAS - SV(MOD-SP2): 38 ML
BH CV ECHO MEAS - SV(MOD-SP4): 16 ML
BH CV ECHO MEAS - SV(TEICH): 19.5 ML
BH CV ECHO MEAS - TAPSE (>1.6): 1.3 CM
BH CV ECHO MEAS - TR MAX VEL: 451 CM/SEC
BH CV ECHO MEASUREMENTS AVERAGE E/E' RATIO: 7.74
BH CV XLRA - RV BASE: 4.1 CM
BH CV XLRA - RV LENGTH: 7 CM
BH CV XLRA - RV MID: 3 CM
LEFT ATRIUM VOLUME INDEX: 22 ML/M2
MAXIMAL PREDICTED HEART RATE: 153 BPM
QT INTERVAL: 378 MS
STRESS TARGET HR: 130 BPM

## 2021-04-29 PROCEDURE — 0 TECHNETIUM ALBUMIN AGGREGATED: Performed by: INTERNAL MEDICINE

## 2021-04-29 PROCEDURE — 78580 LUNG PERFUSION IMAGING: CPT

## 2021-04-29 PROCEDURE — 0 IOPAMIDOL PER 1 ML: Performed by: INTERNAL MEDICINE

## 2021-04-29 PROCEDURE — 94799 UNLISTED PULMONARY SVC/PX: CPT

## 2021-04-29 PROCEDURE — 71275 CT ANGIOGRAPHY CHEST: CPT

## 2021-04-29 PROCEDURE — A9540 TC99M MAA: HCPCS | Performed by: INTERNAL MEDICINE

## 2021-04-29 RX ADMIN — DORZOLAMIDE HYDROCHLORIDE 1 DROP: 20 SOLUTION/ DROPS OPHTHALMIC at 08:38

## 2021-04-29 RX ADMIN — METOPROLOL SUCCINATE 50 MG: 50 TABLET, EXTENDED RELEASE ORAL at 12:04

## 2021-04-29 RX ADMIN — KIT FOR THE PREPARATION OF TECHNETIUM TC 99M ALBUMIN AGGREGATED 1 DOSE: 2.5 INJECTION, POWDER, FOR SOLUTION INTRAVENOUS at 09:35

## 2021-04-29 RX ADMIN — Medication 1 TABLET: at 08:37

## 2021-04-29 RX ADMIN — BRIMONIDINE TARTRATE 1 DROP: 1.5 SOLUTION OPHTHALMIC at 08:38

## 2021-04-29 RX ADMIN — BUDESONIDE AND FORMOTEROL FUMARATE DIHYDRATE 2 PUFF: 160; 4.5 AEROSOL RESPIRATORY (INHALATION) at 20:36

## 2021-04-29 RX ADMIN — DILTIAZEM HYDROCHLORIDE 300 MG: 180 CAPSULE, COATED, EXTENDED RELEASE ORAL at 08:35

## 2021-04-29 RX ADMIN — ALBUTEROL SULFATE 2.5 MG: 2.5 SOLUTION RESPIRATORY (INHALATION) at 14:52

## 2021-04-29 RX ADMIN — DORZOLAMIDE HYDROCHLORIDE 1 DROP: 20 SOLUTION/ DROPS OPHTHALMIC at 21:34

## 2021-04-29 RX ADMIN — TIOTROPIUM BROMIDE INHALATION SPRAY 2 PUFF: 3.12 SPRAY, METERED RESPIRATORY (INHALATION) at 08:57

## 2021-04-29 RX ADMIN — PILOCARPINE HYDROCHLORIDE 1 DROP: 10 SOLUTION/ DROPS OPHTHALMIC at 21:34

## 2021-04-29 RX ADMIN — APIXABAN 5 MG: 5 TABLET, FILM COATED ORAL at 08:36

## 2021-04-29 RX ADMIN — PILOCARPINE HYDROCHLORIDE 1 DROP: 10 SOLUTION/ DROPS OPHTHALMIC at 08:38

## 2021-04-29 RX ADMIN — IOPAMIDOL 100 ML: 755 INJECTION, SOLUTION INTRAVENOUS at 10:18

## 2021-04-29 RX ADMIN — BUMETANIDE 2 MG: 2 TABLET ORAL at 08:37

## 2021-04-29 RX ADMIN — BUDESONIDE AND FORMOTEROL FUMARATE DIHYDRATE 2 PUFF: 160; 4.5 AEROSOL RESPIRATORY (INHALATION) at 09:03

## 2021-04-29 RX ADMIN — Medication 5000 UNITS: at 08:35

## 2021-04-29 RX ADMIN — ACETAZOLAMIDE 250 MG: 250 TABLET ORAL at 08:35

## 2021-04-29 RX ADMIN — ACETAZOLAMIDE 250 MG: 250 TABLET ORAL at 21:34

## 2021-04-29 RX ADMIN — APIXABAN 5 MG: 5 TABLET, FILM COATED ORAL at 21:34

## 2021-04-29 RX ADMIN — SPIRONOLACTONE 25 MG: 25 TABLET ORAL at 08:37

## 2021-04-29 RX ADMIN — BRIMONIDINE TARTRATE 1 DROP: 1.5 SOLUTION OPHTHALMIC at 21:34

## 2021-04-30 ENCOUNTER — READMISSION MANAGEMENT (OUTPATIENT)
Dept: CALL CENTER | Facility: HOSPITAL | Age: 66
End: 2021-04-30

## 2021-04-30 VITALS
WEIGHT: 146.7 LBS | DIASTOLIC BLOOD PRESSURE: 77 MMHG | RESPIRATION RATE: 18 BRPM | HEIGHT: 60 IN | SYSTOLIC BLOOD PRESSURE: 100 MMHG | TEMPERATURE: 98.4 F | BODY MASS INDEX: 28.8 KG/M2 | HEART RATE: 74 BPM | OXYGEN SATURATION: 97 %

## 2021-04-30 PROCEDURE — 94799 UNLISTED PULMONARY SVC/PX: CPT

## 2021-04-30 RX ADMIN — TIOTROPIUM BROMIDE INHALATION SPRAY 2 PUFF: 3.12 SPRAY, METERED RESPIRATORY (INHALATION) at 08:31

## 2021-04-30 RX ADMIN — APIXABAN 5 MG: 5 TABLET, FILM COATED ORAL at 08:48

## 2021-04-30 RX ADMIN — PILOCARPINE HYDROCHLORIDE 1 DROP: 10 SOLUTION/ DROPS OPHTHALMIC at 08:49

## 2021-04-30 RX ADMIN — DILTIAZEM HYDROCHLORIDE 300 MG: 180 CAPSULE, COATED, EXTENDED RELEASE ORAL at 08:48

## 2021-04-30 RX ADMIN — DORZOLAMIDE HYDROCHLORIDE 1 DROP: 20 SOLUTION/ DROPS OPHTHALMIC at 08:49

## 2021-04-30 RX ADMIN — ALBUTEROL SULFATE 2.5 MG: 2.5 SOLUTION RESPIRATORY (INHALATION) at 12:31

## 2021-04-30 RX ADMIN — BUDESONIDE AND FORMOTEROL FUMARATE DIHYDRATE 2 PUFF: 160; 4.5 AEROSOL RESPIRATORY (INHALATION) at 08:35

## 2021-04-30 RX ADMIN — SPIRONOLACTONE 25 MG: 25 TABLET ORAL at 08:48

## 2021-04-30 RX ADMIN — ACETAZOLAMIDE 250 MG: 250 TABLET ORAL at 08:48

## 2021-04-30 RX ADMIN — BRIMONIDINE TARTRATE 1 DROP: 1.5 SOLUTION OPHTHALMIC at 08:49

## 2021-04-30 RX ADMIN — Medication 1 TABLET: at 08:48

## 2021-04-30 RX ADMIN — BUMETANIDE 2 MG: 2 TABLET ORAL at 08:48

## 2021-04-30 RX ADMIN — Medication 5000 UNITS: at 08:48

## 2021-05-03 ENCOUNTER — TRANSITIONAL CARE MANAGEMENT TELEPHONE ENCOUNTER (OUTPATIENT)
Dept: CALL CENTER | Facility: HOSPITAL | Age: 66
End: 2021-05-03

## 2021-05-03 NOTE — OUTREACH NOTE
Call Center TCM Note      Responses   [facility] patient discharged from?  [place]   Does the patient have one of the following disease processes/diagnoses(primary or secondary)?  Other   TCM attempt successful?  No   Unsuccessful attempts  Attempt 1          Rajni Donovan MA    5/3/2021, 14:54 EDT

## 2021-05-03 NOTE — OUTREACH NOTE
Call Center TCM Note      Responses   [facility] patient discharged from?  Non-BH   Does the patient have one of the following disease processes/diagnoses(primary or secondary)?  Other   TCM attempt successful?  No   Unsuccessful attempts  Attempt 2          Rajni Donovan MA    5/3/2021, 16:53 EDT

## 2021-05-04 ENCOUNTER — TRANSITIONAL CARE MANAGEMENT TELEPHONE ENCOUNTER (OUTPATIENT)
Dept: CALL CENTER | Facility: HOSPITAL | Age: 66
End: 2021-05-04

## 2021-05-04 NOTE — OUTREACH NOTE
Call Center TCM Note      Responses   [facility] patient discharged from?  Non-BH   Does the patient have one of the following disease processes/diagnoses(primary or secondary)?  Other   TCM attempt successful?  No   Unsuccessful attempts  Attempt 3   Wrap up additional comments  Unable to reach pt x 3 attempts for TCM call. Pt is not yet sched for TCM [facility] with PCP Daya WELLS, which would need to be completed by 05/14/2021          Rajni Donovan MA    5/4/2021, 15:57 EDT

## 2021-05-08 ENCOUNTER — READMISSION MANAGEMENT (OUTPATIENT)
Dept: CALL CENTER | Facility: HOSPITAL | Age: 66
End: 2021-05-08

## 2021-05-10 ENCOUNTER — READMISSION MANAGEMENT (OUTPATIENT)
Dept: CALL CENTER | Facility: HOSPITAL | Age: 66
End: 2021-05-10

## 2021-05-10 NOTE — OUTREACH NOTE
Medical Week 2 Survey      Responses   [facility] patient discharged from?  [place]   Does the patient have one of the following disease processes/diagnoses(primary or secondary)?  Other   Week 2 attempt successful?  Yes   Call start time  1448   Discharge diagnosis  Hypotension/Aflutter   Call end time  1453   Meds reviewed with patient/caregiver?  Yes   Is the patient having any side effects they believe may be caused by any medication additions or changes?  No   Does the patient have all medications ordered at discharge?  Yes   Is the patient taking all medications as directed (includes completed medication regime)?  Yes   Does the patient have a primary care provider?   Yes   Does the patient have an appointment with their PCP within 7 days of discharge?  Yes   Has the patient kept scheduled appointments due by today?  Yes   Comments  5/12 cardiology   Has home health visited the patient within 72 hours of discharge?  N/A   Did the patient receive a copy of their discharge instructions?  Yes   Nursing interventions  Reviewed instructions with patient   What is the patient's perception of their health status since discharge?  Returned to baseline/stable   Is the patient/caregiver able to teach back signs and symptoms related to disease process for when to call PCP?  Yes   Is the patient/caregiver able to teach back signs and symptoms related to disease process for when to call 911?  Yes   Is the patient/caregiver able to teach back the hierarchy of who to call/visit for symptoms/problems? PCP, Specialist, Home health nurse, Urgent Care, ED, 911  Yes   If the patient is a current smoker, are they able to teach back resources for cessation?  Not a smoker   Week 2 Call Completed?  Yes   Wrap up additional comments  Has seen PCP, seeing cardiology this week 5/12.  States has not been feeling any palpitations and BP more stable.  She feels about the same.  Pt suffers from severe pulmonary HTN. Has pacemaker.            Laya Phillips, RN

## 2021-05-18 ENCOUNTER — READMISSION MANAGEMENT (OUTPATIENT)
Dept: CALL CENTER | Facility: HOSPITAL | Age: 66
End: 2021-05-18

## 2021-05-18 NOTE — OUTREACH NOTE
Medical Week 3 Survey      Responses   [facility] patient discharged from?  [place]   Does the patient have one of the following disease processes/diagnoses(primary or secondary)?  Other   Week 3 attempt successful?  No   Unsuccessful attempts  Attempt 1          Candy Rivera RN

## 2021-05-24 ENCOUNTER — READMISSION MANAGEMENT (OUTPATIENT)
Dept: CALL CENTER | Facility: HOSPITAL | Age: 66
End: 2021-05-24

## 2021-05-24 NOTE — OUTREACH NOTE
Medical Week 3 Survey      Responses   [facility] patient discharged from?  [place]   Does the patient have one of the following disease processes/diagnoses(primary or secondary)?  Other   Week 3 attempt successful?  No   Unsuccessful attempts  Attempt 2 [admitted to [facility] on 5/18 for cardioversion ? not sure if still inpatient]          Alicia Longo RN

## 2021-07-02 ENCOUNTER — TELEPHONE (OUTPATIENT)
Dept: INTERNAL MEDICINE | Facility: CLINIC | Age: 66
End: 2021-07-02

## 2021-07-02 DIAGNOSIS — E11.9 TYPE 2 DIABETES MELLITUS WITHOUT COMPLICATION, WITHOUT LONG-TERM CURRENT USE OF INSULIN (HCC): Primary | ICD-10-CM

## 2021-07-02 NOTE — TELEPHONE ENCOUNTER
PATIENT STATES:THAT SHE DON'T WANT THE metFORMIN ER (GLUCOPHAGE-XR) 500 MG 24 hr tablet PLEASE ADVISE        PATIENT CAN BE REACHED ON:[phone]    PHARMACY PREFERRED:  [facility] [address] [place] [address], [place] - [address] [facility] AT [address] - [phone] [facility] [phone]   [phone]

## 2021-07-02 NOTE — TELEPHONE ENCOUNTER
Pt aware that you are out of the office. She stated that this was not urgent at all. She is wanting to switch her metformin to the non-extended release form to see if this helps her with cost. Please advise.

## 2021-09-02 ENCOUNTER — OFFICE VISIT (OUTPATIENT)
Dept: INTERNAL MEDICINE | Facility: CLINIC | Age: 66
End: 2021-09-02

## 2021-09-02 VITALS
HEIGHT: 60 IN | DIASTOLIC BLOOD PRESSURE: 48 MMHG | WEIGHT: 145 LBS | TEMPERATURE: 97.1 F | SYSTOLIC BLOOD PRESSURE: 70 MMHG | BODY MASS INDEX: 28.47 KG/M2 | HEART RATE: 63 BPM | OXYGEN SATURATION: 95 %

## 2021-09-02 DIAGNOSIS — L03.90 CELLULITIS, UNSPECIFIED CELLULITIS SITE: Primary | ICD-10-CM

## 2021-09-02 DIAGNOSIS — H93.90 EAR LESION: ICD-10-CM

## 2021-09-02 PROCEDURE — 99213 OFFICE O/P EST LOW 20 MIN: CPT | Performed by: NURSE PRACTITIONER

## 2021-09-02 RX ORDER — CEPHALEXIN 500 MG/1
500 CAPSULE ORAL 2 TIMES DAILY
Qty: 20 CAPSULE | Refills: 0 | Status: SHIPPED | OUTPATIENT
Start: 2021-09-02 | End: 2022-03-28

## 2021-09-02 NOTE — PROGRESS NOTES
Subjective   Laine Thomas is a 66 y.o. female. Patient is here today for   Chief Complaint   Patient presents with   • Earache     sore on left ear for about 2 months    .    History of Present Illness   C/o sore in left ear for about 2 months. It will occasionally scabbed up. Never feels it draining. Irritating.   No fever. Tried running warm shower water on it without much change. She has tried neosporin without much relief. Patient tried to get in to see a dermatologist, but couldn't get an appointment until November    The following portions of the patient's history were reviewed and updated as appropriate: allergies, current medications, past family history, past medical history, past social history, past surgical history and problem list.    Review of Systems    Objective   Vitals:    09/02/21 1450   BP: (!) 70/48   Pulse: 63   Temp: 97.1 °F (36.2 °C)   SpO2: 95%     Hypotension - She just saw her cardiologist today and medications were changed.     Body mass index is 28.47 kg/m².  Physical Exam  Vitals and nursing note reviewed.   Constitutional:       Appearance: Normal appearance. She is well-developed.   Cardiovascular:      Rate and Rhythm: Normal rate and regular rhythm.      Heart sounds: Normal heart sounds.   Pulmonary:      Effort: Pulmonary effort is normal.      Breath sounds: Normal breath sounds.   Skin:     General: Skin is warm and dry.      Findings: Erythema (left outer canal; small palpable cyst) present.   Neurological:      Mental Status: She is alert and oriented to person, place, and time.   Psychiatric:         Speech: Speech normal.         Behavior: Behavior normal.         Thought Content: Thought content normal.         Assessment/Plan   Diagnoses and all orders for this visit:    1. Cellulitis, unspecified cellulitis site (Primary)  -     cephalexin (Keflex) 500 MG capsule; Take 1 capsule by mouth 2 (Two) Times a Day.  Dispense: 20 capsule; Refill: 0  -     Ambulatory Referral  to Dermatology    2. Ear lesion  -     Ambulatory Referral to Dermatology

## 2021-09-07 DIAGNOSIS — E11.9 TYPE 2 DIABETES MELLITUS WITHOUT COMPLICATION, WITHOUT LONG-TERM CURRENT USE OF INSULIN (HCC): ICD-10-CM

## 2021-09-07 RX ORDER — METFORMIN HYDROCHLORIDE 500 MG/1
TABLET, EXTENDED RELEASE ORAL
Qty: 180 TABLET | Refills: 2 | OUTPATIENT
Start: 2021-09-07

## 2022-03-20 DIAGNOSIS — E11.9 TYPE 2 DIABETES MELLITUS WITHOUT COMPLICATION, WITHOUT LONG-TERM CURRENT USE OF INSULIN: ICD-10-CM

## 2022-03-24 DIAGNOSIS — E11.9 TYPE 2 DIABETES MELLITUS WITHOUT COMPLICATION, WITHOUT LONG-TERM CURRENT USE OF INSULIN: Primary | ICD-10-CM

## 2022-03-24 DIAGNOSIS — E78.5 HYPERLIPIDEMIA, UNSPECIFIED HYPERLIPIDEMIA TYPE: ICD-10-CM

## 2022-03-25 LAB
ALBUMIN SERPL-MCNC: 4.6 G/DL (ref 3.8–4.8)
ALBUMIN/GLOB SERPL: 1.8 {RATIO} (ref 1.2–2.2)
ALP SERPL-CCNC: 94 IU/L (ref 44–121)
ALT SERPL-CCNC: 14 IU/L (ref 0–32)
AST SERPL-CCNC: 18 IU/L (ref 0–40)
BASOPHILS # BLD AUTO: 0 X10E3/UL (ref 0–0.2)
BASOPHILS NFR BLD AUTO: 0 %
BILIRUB SERPL-MCNC: 0.3 MG/DL (ref 0–1.2)
BUN SERPL-MCNC: 32 MG/DL (ref 8–27)
BUN/CREAT SERPL: 29 (ref 12–28)
CALCIUM SERPL-MCNC: 9.8 MG/DL (ref 8.7–10.3)
CHLORIDE SERPL-SCNC: 104 MMOL/L (ref 96–106)
CHOLEST SERPL-MCNC: 191 MG/DL (ref 100–199)
CHOLEST/HDLC SERPL: 4 RATIO (ref 0–4.4)
CO2 SERPL-SCNC: 19 MMOL/L (ref 20–29)
CREAT SERPL-MCNC: 1.12 MG/DL (ref 0.57–1)
EGFRCR SERPLBLD CKD-EPI 2021: 54 ML/MIN/1.73
EOSINOPHIL # BLD AUTO: 0 X10E3/UL (ref 0–0.4)
EOSINOPHIL NFR BLD AUTO: 0 %
ERYTHROCYTE [DISTWIDTH] IN BLOOD BY AUTOMATED COUNT: 12.3 % (ref 11.7–15.4)
GLOBULIN SER CALC-MCNC: 2.6 G/DL (ref 1.5–4.5)
GLUCOSE SERPL-MCNC: 100 MG/DL (ref 65–99)
HBA1C MFR BLD: 5.7 % (ref 4.8–5.6)
HCT VFR BLD AUTO: 35.7 % (ref 34–46.6)
HDLC SERPL-MCNC: 48 MG/DL
HGB BLD-MCNC: 11.8 G/DL (ref 11.1–15.9)
IMM GRANULOCYTES # BLD AUTO: 0.1 X10E3/UL (ref 0–0.1)
IMM GRANULOCYTES NFR BLD AUTO: 1 %
LDLC SERPL CALC-MCNC: 119 MG/DL (ref 0–99)
LYMPHOCYTES # BLD AUTO: 1.1 X10E3/UL (ref 0.7–3.1)
LYMPHOCYTES NFR BLD AUTO: 9 %
MCH RBC QN AUTO: 31.6 PG (ref 26.6–33)
MCHC RBC AUTO-ENTMCNC: 33.1 G/DL (ref 31.5–35.7)
MCV RBC AUTO: 96 FL (ref 79–97)
MONOCYTES # BLD AUTO: 1 X10E3/UL (ref 0.1–0.9)
MONOCYTES NFR BLD AUTO: 8 %
NEUTROPHILS # BLD AUTO: 10 X10E3/UL (ref 1.4–7)
NEUTROPHILS NFR BLD AUTO: 82 %
PLATELET # BLD AUTO: 339 X10E3/UL (ref 150–450)
POTASSIUM SERPL-SCNC: 4.2 MMOL/L (ref 3.5–5.2)
PROT SERPL-MCNC: 7.2 G/DL (ref 6–8.5)
RBC # BLD AUTO: 3.73 X10E6/UL (ref 3.77–5.28)
SODIUM SERPL-SCNC: 140 MMOL/L (ref 134–144)
TRIGL SERPL-MCNC: 133 MG/DL (ref 0–149)
VLDLC SERPL CALC-MCNC: 24 MG/DL (ref 5–40)
WBC # BLD AUTO: 12.1 X10E3/UL (ref 3.4–10.8)

## 2022-03-28 ENCOUNTER — OFFICE VISIT (OUTPATIENT)
Dept: INTERNAL MEDICINE | Facility: CLINIC | Age: 67
End: 2022-03-28

## 2022-03-28 VITALS
BODY MASS INDEX: 28.27 KG/M2 | WEIGHT: 144 LBS | OXYGEN SATURATION: 97 % | HEART RATE: 69 BPM | HEIGHT: 60 IN | DIASTOLIC BLOOD PRESSURE: 48 MMHG | SYSTOLIC BLOOD PRESSURE: 102 MMHG

## 2022-03-28 DIAGNOSIS — E11.9 TYPE 2 DIABETES MELLITUS WITHOUT COMPLICATION, WITHOUT LONG-TERM CURRENT USE OF INSULIN: Primary | ICD-10-CM

## 2022-03-28 DIAGNOSIS — E78.5 HYPERLIPIDEMIA, UNSPECIFIED HYPERLIPIDEMIA TYPE: ICD-10-CM

## 2022-03-28 PROBLEM — Z79.01 CHRONIC ANTICOAGULATION: Status: ACTIVE | Noted: 2021-06-09

## 2022-03-28 PROBLEM — I27.21 PAH (PULMONARY ARTERY HYPERTENSION) (HCC): Status: ACTIVE | Noted: 2021-05-07

## 2022-03-28 PROBLEM — Z09 HOSPITAL DISCHARGE FOLLOW-UP: Status: ACTIVE | Noted: 2021-05-07

## 2022-03-28 PROCEDURE — 99214 OFFICE O/P EST MOD 30 MIN: CPT | Performed by: NURSE PRACTITIONER

## 2022-03-28 RX ORDER — PREDNISOLONE ACETATE 10 MG/ML
1 SUSPENSION/ DROPS OPHTHALMIC 4 TIMES DAILY
COMMUNITY
Start: 2022-03-22 | End: 2023-01-03

## 2022-03-28 RX ORDER — RIOCIGUAT 2.5 MG/1
2.5 TABLET, FILM COATED ORAL 2 TIMES DAILY
COMMUNITY
Start: 2022-03-25

## 2022-03-28 RX ORDER — DILTIAZEM HYDROCHLORIDE 300 MG/1
300 CAPSULE, EXTENDED RELEASE ORAL DAILY
COMMUNITY
Start: 2022-03-15

## 2022-03-28 RX ORDER — MACITENTAN 10 MG/1
1 TABLET, FILM COATED ORAL DAILY
COMMUNITY

## 2022-03-28 RX ORDER — METOPROLOL SUCCINATE 25 MG/1
0.5 TABLET, EXTENDED RELEASE ORAL DAILY
COMMUNITY
Start: 2022-03-27 | End: 2022-09-30

## 2022-03-28 NOTE — PROGRESS NOTES
Subjective   Laine Thomas is a 67 y.o. female. Patient is here today for   Chief Complaint   Patient presents with   • Diabetes   • Hyperlipidemia   .    History of Present Illness   Here to follow up on diabetes which is controlled on current medications. Denies any problems with low glucose readings.   Patient states that she is eating better    O2 2-4 L per nasal cannula. She has been on this since May 2021 when she was diagnosed with pulmonary hypertension    Left eye redness - laser surgery for glaucoma last week. She is currently on steroid drops    Patient is here to follow up on hyperlipidemia. She is unable to tolerate statins       The following portions of the patient's history were reviewed and updated as appropriate: allergies, current medications, past family history, past medical history, past social history, past surgical history and problem list.    Review of Systems    Objective     Vitals:    03/28/22 1101   BP: 102/48   Pulse: 69   SpO2: 97%     Body mass index is 28.27 kg/m².    Orders Only on 03/24/2022   Component Date Value Ref Range Status   • WBC 03/24/2022 12.1 (A) 3.4 - 10.8 x10E3/uL Final   • RBC 03/24/2022 3.73 (A) 3.77 - 5.28 x10E6/uL Final   • Hemoglobin 03/24/2022 11.8  11.1 - 15.9 g/dL Final   • Hematocrit 03/24/2022 35.7  34.0 - 46.6 % Final   • MCV 03/24/2022 96  79 - 97 fL Final   • MCH 03/24/2022 31.6  26.6 - 33.0 pg Final   • MCHC 03/24/2022 33.1  31.5 - 35.7 g/dL Final   • RDW 03/24/2022 12.3  11.7 - 15.4 % Final   • Platelets 03/24/2022 339  150 - 450 x10E3/uL Final   • Neutrophil Rel % 03/24/2022 82  Not Estab. % Final   • Lymphocyte Rel % 03/24/2022 9  Not Estab. % Final   • Monocyte Rel % 03/24/2022 8  Not Estab. % Final   • Eosinophil Rel % 03/24/2022 0  Not Estab. % Final   • Basophil Rel % 03/24/2022 0  Not Estab. % Final   • Neutrophils Absolute 03/24/2022 10.0 (A) 1.4 - 7.0 x10E3/uL Final   • Lymphocytes Absolute 03/24/2022 1.1  0.7 - 3.1 x10E3/uL Final   •  Monocytes Absolute 03/24/2022 1.0 (A) 0.1 - 0.9 x10E3/uL Final   • Eosinophils Absolute 03/24/2022 0.0  0.0 - 0.4 x10E3/uL Final   • Basophils Absolute 03/24/2022 0.0  0.0 - 0.2 x10E3/uL Final   • Immature Granulocyte Rel % 03/24/2022 1  Not Estab. % Final   • Immature Grans Absolute 03/24/2022 0.1  0.0 - 0.1 x10E3/uL Final   • Glucose 03/24/2022 100 (A) 65 - 99 mg/dL Final   • BUN 03/24/2022 32 (A) 8 - 27 mg/dL Final   • Creatinine 03/24/2022 1.12 (A) 0.57 - 1.00 mg/dL Final   • EGFR Result 03/24/2022 54 (A) >59 mL/min/1.73 Final   • BUN/Creatinine Ratio 03/24/2022 29 (A) 12 - 28 Final   • Sodium 03/24/2022 140  134 - 144 mmol/L Final   • Potassium 03/24/2022 4.2  3.5 - 5.2 mmol/L Final   • Chloride 03/24/2022 104  96 - 106 mmol/L Final   • Total CO2 03/24/2022 19 (A) 20 - 29 mmol/L Final   • Calcium 03/24/2022 9.8  8.7 - 10.3 mg/dL Final   • Total Protein 03/24/2022 7.2  6.0 - 8.5 g/dL Final   • Albumin 03/24/2022 4.6  3.8 - 4.8 g/dL Final   • Globulin 03/24/2022 2.6  1.5 - 4.5 g/dL Final   • A/G Ratio 03/24/2022 1.8  1.2 - 2.2 Final   • Total Bilirubin 03/24/2022 0.3  0.0 - 1.2 mg/dL Final   • Alkaline Phosphatase 03/24/2022 94  44 - 121 IU/L Final   • AST (SGOT) 03/24/2022 18  0 - 40 IU/L Final   • ALT (SGPT) 03/24/2022 14  0 - 32 IU/L Final   • Total Cholesterol 03/24/2022 191  100 - 199 mg/dL Final   • Triglycerides 03/24/2022 133  0 - 149 mg/dL Final   • HDL Cholesterol 03/24/2022 48  >39 mg/dL Final   • VLDL Cholesterol Honorio 03/24/2022 24  5 - 40 mg/dL Final   • LDL Chol Calc ([facility]) 03/24/2022 119 (A) 0 - 99 mg/dL Final   • Chol/HDL Ratio 03/24/2022 4.0  0.0 - 4.4 ratio Final    Comment:                                   T. Chol/HDL Ratio                                              Men  Women                                1/2 Avg.Risk  3.4    3.3                                    Avg.Risk  5.0    4.4                                 2X Avg.Risk  9.6    7.1                                 3X Avg.Risk 23.4    11.0     • Hemoglobin A1C 03/24/2022 5.7 (A) 4.8 - 5.6 % Final    Comment:          Prediabetes: 5.7 - 6.4           Diabetes: >6.4           Glycemic control for adults with diabetes: <7.0       WBC count may be elevated due to recent eye surgery    Reviewed labs with patient.     Physical Exam  Vitals and nursing note reviewed.   Constitutional:       Appearance: Normal appearance. She is well-developed.   Cardiovascular:      Rate and Rhythm: Normal rate and regular rhythm.      Heart sounds: Normal heart sounds.   Pulmonary:      Effort: Pulmonary effort is normal.      Breath sounds: Decreased breath sounds present.   Skin:     General: Skin is warm and dry.   Neurological:      Mental Status: She is alert and oriented to person, place, and time.   Psychiatric:         Speech: Speech normal.         Behavior: Behavior normal.         Thought Content: Thought content normal.         Assessment/Plan   Diagnoses and all orders for this visit:    1. Type 2 diabetes mellitus without complication, without long-term current use of insulin (HCC) (Primary)    2. Hyperlipidemia, unspecified hyperlipidemia type    DM - continue metformin  mg po BID    HLD - continue with lifestyle changes since she cannot tolerate statins             Current Outpatient Medications:   •  acetaZOLAMIDE (DIAMOX) 250 MG tablet, Take 1 tablet by mouth 2 (Two) Times a Day., Disp: , Rfl:   •  Adempas 2.5 MG tablet, Take 2.5 mg by mouth 2 (Two) Times a Day., Disp: , Rfl:   •  albuterol (PROVENTIL HFA;VENTOLIN HFA) 108 (90 BASE) MCG/ACT inhaler, Inhale 2 puffs Every 4 (Four) Hours As Needed for Wheezing., Disp: 18 g, Rfl: 3  •  brimonidine (ALPHAGAN) 0.15 % ophthalmic solution, Administer 1 drop to both eyes 2 (Two) Times a Day., Disp: , Rfl:   •  bumetanide (BUMEX) 1 MG tablet, Take 2 mg by mouth Daily., Disp: , Rfl:   •  Cholecalciferol (VITAMIN D3) 125 MCG (5000 UT) capsule capsule, Take 5,000 Units by mouth Daily., Disp: , Rfl:   •   ciclopirox (LOPROX) 0.77 % cream, Apply 1 application topically to the appropriate area as directed 3 (Three) Times a Day., Disp: , Rfl:   •  dilTIAZem (TIAZAC) 300 MG 24 hr capsule, Take 300 mg by mouth Daily., Disp: , Rfl:   •  dorzolamide (TRUSOPT) 2 % ophthalmic solution, Apply 1 drop to eye(s) as directed by provider 2 (two) times a day., Disp: , Rfl:   •  ELIQUIS 5 MG tablet tablet, Take 1 tablet by mouth 2 (Two) Times a Day., Disp: , Rfl:   •  glucose monitor monitoring kit, 1 each Daily. Use to check glucose daily, Disp: 1 each, Rfl: 0  •  ibuprofen (ADVIL,MOTRIN) 200 MG tablet, Take 200 mg by mouth Every 6 (Six) Hours As Needed for Mild Pain ., Disp: , Rfl:   •  Macitentan (Opsumit) 10 MG tablet, Take 1 tablet by mouth Daily., Disp: , Rfl:   •  metFORMIN (GLUCOPHAGE) 500 MG tablet, TAKE ONE TABLET BY MOUTH TWICE A DAY WITH MEALS, Disp: 180 tablet, Rfl: 1  •  metoprolol succinate XL (TOPROL-XL) 25 MG 24 hr tablet, Take 0.5 tablets by mouth Daily., Disp: , Rfl:   •  Multiple Vitamin (MULTI VITAMIN DAILY PO), Take 1 tablet by mouth daily., Disp: , Rfl:   •  O2 (OXYGEN), Inhale 3 L Daily., Disp: , Rfl:   •  pilocarpine (PILOCAR) 1 % ophthalmic solution, Administer 1 drop to both eyes 2 (Two) Times a Day., Disp: , Rfl:   •  prednisoLONE acetate (PRED FORTE) 1 % ophthalmic suspension, Administer 1 drop into the left eye 4 (Four) Times a Day., Disp: , Rfl:   •  spironolactone (ALDACTONE) 25 MG tablet, Take 1 tablet by mouth Daily., Disp: , Rfl:   •  TRELEGY ELLIPTA 100-62.5-25 MCG/INH aerosol powder , Inhale 1 puff Daily., Disp: , Rfl:

## 2022-09-18 DIAGNOSIS — E11.9 TYPE 2 DIABETES MELLITUS WITHOUT COMPLICATION, WITHOUT LONG-TERM CURRENT USE OF INSULIN: ICD-10-CM

## 2022-09-26 DIAGNOSIS — Z00.00 HEALTHCARE MAINTENANCE: ICD-10-CM

## 2022-09-26 DIAGNOSIS — I10 PRIMARY HYPERTENSION: ICD-10-CM

## 2022-09-26 DIAGNOSIS — E78.5 HYPERLIPIDEMIA, UNSPECIFIED HYPERLIPIDEMIA TYPE: Primary | ICD-10-CM

## 2022-09-26 DIAGNOSIS — E11.9 TYPE 2 DIABETES MELLITUS WITHOUT COMPLICATION, WITHOUT LONG-TERM CURRENT USE OF INSULIN: Primary | ICD-10-CM

## 2022-09-26 DIAGNOSIS — E11.9 TYPE 2 DIABETES MELLITUS WITHOUT COMPLICATION, WITHOUT LONG-TERM CURRENT USE OF INSULIN: ICD-10-CM

## 2022-09-27 LAB
ALBUMIN SERPL-MCNC: 4.8 G/DL (ref 3.5–5.2)
ALBUMIN/GLOB SERPL: 2.2 G/DL
ALP SERPL-CCNC: 96 U/L (ref 39–117)
ALT SERPL-CCNC: 15 U/L (ref 1–33)
AST SERPL-CCNC: 19 U/L (ref 1–32)
BILIRUB SERPL-MCNC: <0.2 MG/DL (ref 0–1.2)
BUN SERPL-MCNC: 34 MG/DL (ref 8–23)
BUN/CREAT SERPL: 27.6 (ref 7–25)
CALCIUM SERPL-MCNC: 10.3 MG/DL (ref 8.6–10.5)
CHLORIDE SERPL-SCNC: 98 MMOL/L (ref 98–107)
CHOLEST SERPL-MCNC: 213 MG/DL (ref 0–200)
CO2 SERPL-SCNC: 26 MMOL/L (ref 22–29)
CREAT SERPL-MCNC: 1.23 MG/DL (ref 0.57–1)
EGFRCR SERPLBLD CKD-EPI 2021: 48 ML/MIN/1.73
GLOBULIN SER CALC-MCNC: 2.2 GM/DL
GLUCOSE SERPL-MCNC: 154 MG/DL (ref 65–99)
HBA1C MFR BLD: 6.4 % (ref 4.8–5.6)
HDLC SERPL-MCNC: 57 MG/DL (ref 40–60)
LDLC SERPL CALC-MCNC: 138 MG/DL (ref 0–100)
LDLC/HDLC SERPL: 2.38 {RATIO}
POTASSIUM SERPL-SCNC: 4.2 MMOL/L (ref 3.5–5.2)
PROT SERPL-MCNC: 7 G/DL (ref 6–8.5)
SODIUM SERPL-SCNC: 138 MMOL/L (ref 136–145)
TRIGL SERPL-MCNC: 102 MG/DL (ref 0–150)
VLDLC SERPL CALC-MCNC: 18 MG/DL (ref 5–40)

## 2022-09-30 ENCOUNTER — OFFICE VISIT (OUTPATIENT)
Dept: INTERNAL MEDICINE | Facility: CLINIC | Age: 67
End: 2022-09-30

## 2022-09-30 VITALS
HEART RATE: 71 BPM | BODY MASS INDEX: 28.43 KG/M2 | DIASTOLIC BLOOD PRESSURE: 60 MMHG | SYSTOLIC BLOOD PRESSURE: 110 MMHG | WEIGHT: 144.8 LBS | HEIGHT: 60 IN | TEMPERATURE: 97.3 F

## 2022-09-30 DIAGNOSIS — E11.22 TYPE 2 DIABETES MELLITUS WITH STAGE 3A CHRONIC KIDNEY DISEASE, WITHOUT LONG-TERM CURRENT USE OF INSULIN: ICD-10-CM

## 2022-09-30 DIAGNOSIS — E78.2 MIXED HYPERLIPIDEMIA: ICD-10-CM

## 2022-09-30 DIAGNOSIS — N18.31 TYPE 2 DIABETES MELLITUS WITH STAGE 3A CHRONIC KIDNEY DISEASE, WITHOUT LONG-TERM CURRENT USE OF INSULIN: ICD-10-CM

## 2022-09-30 DIAGNOSIS — Z00.00 ENCOUNTER FOR SUBSEQUENT ANNUAL WELLNESS VISIT (AWV) IN MEDICARE PATIENT: Primary | ICD-10-CM

## 2022-09-30 DIAGNOSIS — Z23 NEED FOR INFLUENZA VACCINATION: ICD-10-CM

## 2022-09-30 PROCEDURE — 90662 IIV NO PRSV INCREASED AG IM: CPT | Performed by: NURSE PRACTITIONER

## 2022-09-30 PROCEDURE — 1159F MED LIST DOCD IN RCRD: CPT | Performed by: NURSE PRACTITIONER

## 2022-09-30 PROCEDURE — G0439 PPPS, SUBSEQ VISIT: HCPCS | Performed by: NURSE PRACTITIONER

## 2022-09-30 PROCEDURE — G0008 ADMIN INFLUENZA VIRUS VAC: HCPCS | Performed by: NURSE PRACTITIONER

## 2022-09-30 PROCEDURE — 1126F AMNT PAIN NOTED NONE PRSNT: CPT | Performed by: NURSE PRACTITIONER

## 2022-09-30 PROCEDURE — 1170F FXNL STATUS ASSESSED: CPT | Performed by: NURSE PRACTITIONER

## 2022-09-30 RX ORDER — CYCLOSPORINE 0.5 MG/ML
EMULSION OPHTHALMIC
COMMUNITY
Start: 2022-08-22

## 2022-09-30 RX ORDER — METOLAZONE 5 MG/1
1 TABLET ORAL DAILY PRN
COMMUNITY
Start: 2022-07-25

## 2022-09-30 NOTE — PROGRESS NOTES
Subjective   Laine Thomas is a 67 y.o. female. Patient is here today for   Chief Complaint   Patient presents with   • Medicare Wellness-subsequent   .    History of Present Illness   Here to follow up on diabetes which is controlled on current medications. Denies any problems.    Patient sees cardiology and pulmonology for pulmonary hypertension. She was prescribed metalozone to take when she has peripheral edema.  She was taking this 3 days a week, but then decreased it down to 2 days a week    The following portions of the patient's history were reviewed and updated as appropriate: allergies, current medications, past family history, past medical history, past social history, past surgical history and problem list.    Review of Systems    Objective     Vitals:    09/30/22 1422   BP: 110/60   Pulse: 71   Temp: 97.3 °F (36.3 °C)     Body mass index is 28.43 kg/m².    Orders Only on 09/26/2022   Component Date Value Ref Range Status   • Glucose 09/26/2022 154 (A) 65 - 99 mg/dL Final   • BUN 09/26/2022 34 (A) 8 - 23 mg/dL Final   • Creatinine 09/26/2022 1.23 (A) 0.57 - 1.00 mg/dL Final   • EGFR Result 09/26/2022 48.0 (A) >60.0 mL/min/1.73 Final    Comment: National Kidney Foundation and American Society of  Nephrology (ASN) Task Force recommended calculation based  on the Chronic Kidney Disease Epidemiology Collaboration  (CKD-EPI) equation refit without adjustment for race.  GFR Normal >60  Chronic Kidney Disease <60  Kidney Failure <15     • BUN/Creatinine Ratio 09/26/2022 27.6 (A) 7.0 - 25.0 Final   • Sodium 09/26/2022 138  136 - 145 mmol/L Final   • Potassium 09/26/2022 4.2  3.5 - 5.2 mmol/L Final   • Chloride 09/26/2022 98  98 - 107 mmol/L Final   • Total CO2 09/26/2022 26.0  22.0 - 29.0 mmol/L Final   • Calcium 09/26/2022 10.3  8.6 - 10.5 mg/dL Final   • Total Protein 09/26/2022 7.0  6.0 - 8.5 g/dL Final   • Albumin 09/26/2022 4.80  3.50 - 5.20 g/dL Final   • Globulin 09/26/2022 2.2  gm/dL Final   • A/G  Ratio 09/26/2022 2.2  g/dL Final   • Total Bilirubin 09/26/2022 <0.2  0.0 - 1.2 mg/dL Final   • Alkaline Phosphatase 09/26/2022 96  39 - 117 U/L Final   • AST (SGOT) 09/26/2022 19  1 - 32 U/L Final   • ALT (SGPT) 09/26/2022 15  1 - 33 U/L Final   • Hemoglobin A1C 09/26/2022 6.40 (A) 4.80 - 5.60 % Final    Comment: Hemoglobin A1C Ranges:  Increased Risk for Diabetes  5.7% to 6.4%  Diabetes                     >= 6.5%  Diabetic Goal                < 7.0%     • Total Cholesterol 09/26/2022 213 (A) 0 - 200 mg/dL Final    Comment: Cholesterol Reference Ranges  (U.S. Department of Health and Human Services ATP III  Classifications)  Desirable          <200 mg/dL  Borderline High    200-239 mg/dL  High Risk          >240 mg/dL  Triglyceride Reference Ranges  (U.S. Department of Health and Human Services ATP III  Classifications)  Normal           <150 mg/dL  Borderline High  150-199 mg/dL  High             200-499 mg/dL  Very High        >500 mg/dL  HDL Reference Ranges  (U.S. Department of Health and Human Services ATP III  Classifications)  Low     <40 mg/dl (major risk factor for CHD)  High    >60 mg/dl ('negative' risk factor for CHD)  LDL Reference Ranges  (U.S. Department of Health and Human Services ATP III  Classifications)  Optimal          <100 mg/dL  Near Optimal     100-129 mg/dL  Borderline High  130-159 mg/dL  High             160-189 mg/dL  Very High        >189 mg/dL     • Triglycerides 09/26/2022 102  0 - 150 mg/dL Final   • HDL Cholesterol 09/26/2022 57  40 - 60 mg/dL Final   • VLDL Cholesterol Honorio 09/26/2022 18  5 - 40 mg/dL Final   • LDL Chol Calc (NIH) 09/26/2022 138 (A) 0 - 100 mg/dL Final   • LDL/HDL RATIO 09/26/2022 2.38   Final     Reviewed labs with patient.     Physical Exam  Vitals and nursing note reviewed.   Constitutional:       Appearance: Normal appearance. She is well-developed.   Cardiovascular:      Rate and Rhythm: Normal rate and regular rhythm.      Heart sounds: Normal heart sounds.    Pulmonary:      Effort: Pulmonary effort is normal.      Breath sounds: Decreased breath sounds present.   Skin:     General: Skin is warm and dry.   Neurological:      Mental Status: She is alert and oriented to person, place, and time.   Psychiatric:         Speech: Speech normal.         Behavior: Behavior normal.         Thought Content: Thought content normal.         Assessment & Plan   Diagnoses and all orders for this visit:    1. Encounter for subsequent annual wellness visit (AWV) in Medicare patient (Primary)    2. Need for influenza vaccination  -     Fluzone High-Dose 65+yrs    3. Mixed hyperlipidemia  -     Lipid Panel With / Chol / HDL Ratio; Future    4. Type 2 diabetes mellitus with stage 3a chronic kidney disease, without long-term current use of insulin (HCC)  -     Comprehensive Metabolic Panel; Future  -     Hemoglobin A1c; Future  -     MicroAlbumin, Urine, Random - Urine, Clean Catch; Future      HLD - patient is unable to tolerate statins. She has increased her fruit and vegetable intake. Recheck in 3 months    DM - HgbA1C has increased slightly. Will recheck in 3 months. May consider adding an SGLT2 to help protect kidneys. Patient will also only take metolazone as needed and not on a regular basis           Current Outpatient Medications:   •  acetaZOLAMIDE (DIAMOX) 250 MG tablet, Take 1 tablet by mouth 2 (Two) Times a Day., Disp: , Rfl:   •  Adempas 2.5 MG tablet, Take 2.5 mg by mouth 2 (Two) Times a Day., Disp: , Rfl:   •  brimonidine (ALPHAGAN) 0.15 % ophthalmic solution, Administer 1 drop to both eyes 2 (Two) Times a Day., Disp: , Rfl:   •  bumetanide (BUMEX) 1 MG tablet, Take 2 mg by mouth Daily., Disp: , Rfl:   •  Cholecalciferol (VITAMIN D3) 125 MCG (5000 UT) capsule capsule, Take 5,000 Units by mouth Daily., Disp: , Rfl:   •  ciclopirox (LOPROX) 0.77 % cream, Apply 1 application topically to the appropriate area as directed 3 (Three) Times a Day., Disp: , Rfl:   •  dilTIAZem  (TIAZAC) 300 MG 24 hr capsule, Take 300 mg by mouth Daily., Disp: , Rfl:   •  dorzolamide (TRUSOPT) 2 % ophthalmic solution, Apply 1 drop to eye(s) as directed by provider 2 (two) times a day., Disp: , Rfl:   •  ELIQUIS 5 MG tablet tablet, Take 1 tablet by mouth 2 (Two) Times a Day., Disp: , Rfl:   •  glucose monitor monitoring kit, 1 each Daily. Use to check glucose daily, Disp: 1 each, Rfl: 0  •  ibuprofen (ADVIL,MOTRIN) 200 MG tablet, Take 200 mg by mouth Every 6 (Six) Hours As Needed for Mild Pain ., Disp: , Rfl:   •  Macitentan (Opsumit) 10 MG tablet, Take 1 tablet by mouth Daily., Disp: , Rfl:   •  metFORMIN (GLUCOPHAGE) 500 MG tablet, TAKE ONE TABLET BY MOUTH TWICE A DAY WITH MEALS, Disp: 180 tablet, Rfl: 1  •  metOLazone (ZAROXOLYN) 5 MG tablet, Take 1 tablet by mouth Daily As Needed., Disp: , Rfl:   •  Multiple Vitamin (MULTI VITAMIN DAILY PO), Take 1 tablet by mouth daily., Disp: , Rfl:   •  O2 (OXYGEN), Inhale 3 L Daily., Disp: , Rfl:   •  pilocarpine (PILOCAR) 2 % ophthalmic solution, Administer 1 drop to both eyes 2 (Two) Times a Day., Disp: , Rfl:   •  Restasis 0.05 % ophthalmic emulsion, , Disp: , Rfl:   •  spironolactone (ALDACTONE) 25 MG tablet, Take 1 tablet by mouth Daily., Disp: , Rfl:   •  TRELEGY ELLIPTA 100-62.5-25 MCG/INH aerosol powder , Inhale 1 puff Daily., Disp: , Rfl:   •  albuterol (PROVENTIL HFA;VENTOLIN HFA) 108 (90 BASE) MCG/ACT inhaler, Inhale 2 puffs Every 4 (Four) Hours As Needed for Wheezing., Disp: 18 g, Rfl: 3  •  prednisoLONE acetate (PRED FORTE) 1 % ophthalmic suspension, Administer 1 drop into the left eye 4 (Four) Times a Day., Disp: , Rfl:

## 2022-09-30 NOTE — PROGRESS NOTES
The ABCs of the Annual Wellness Visit  Subsequent Medicare Wellness Visit    Chief Complaint   Patient presents with   • Medicare Wellness-subsequent      Subjective    History of Present Illness:  Laine Thomas is a 67 y.o. female who presents for a Subsequent Medicare Wellness Visit.    The following portions of the patient's history were reviewed and   updated as appropriate: allergies, current medications, past family history, past medical history, past social history, past surgical history and problem list.    Compared to one year ago, the patient feels her physical   health is better.    Compared to one year ago, the patient feels her mental   health is better.    Recent Hospitalizations:  She was not admitted to the hospital during the last year.       Current Medical Providers:  Patient Care Team:  Daya Bowser APRN as PCP - General (Family Medicine)  Maude Osuna MD (Pulmonary Disease)    Outpatient Medications Prior to Visit   Medication Sig Dispense Refill   • acetaZOLAMIDE (DIAMOX) 250 MG tablet Take 1 tablet by mouth 2 (Two) Times a Day.     • Adempas 2.5 MG tablet Take 2.5 mg by mouth 2 (Two) Times a Day.     • brimonidine (ALPHAGAN) 0.15 % ophthalmic solution Administer 1 drop to both eyes 2 (Two) Times a Day.     • bumetanide (BUMEX) 1 MG tablet Take 2 mg by mouth Daily.     • Cholecalciferol (VITAMIN D3) 125 MCG (5000 UT) capsule capsule Take 5,000 Units by mouth Daily.     • ciclopirox (LOPROX) 0.77 % cream Apply 1 application topically to the appropriate area as directed 3 (Three) Times a Day.     • dilTIAZem (TIAZAC) 300 MG 24 hr capsule Take 300 mg by mouth Daily.     • dorzolamide (TRUSOPT) 2 % ophthalmic solution Apply 1 drop to eye(s) as directed by provider 2 (two) times a day.     • ELIQUIS 5 MG tablet tablet Take 1 tablet by mouth 2 (Two) Times a Day.     • glucose monitor monitoring kit 1 each Daily. Use to check glucose daily 1 each 0   • ibuprofen (ADVIL,MOTRIN) 200 MG tablet  Take 200 mg by mouth Every 6 (Six) Hours As Needed for Mild Pain .     • Macitentan (Opsumit) 10 MG tablet Take 1 tablet by mouth Daily.     • metFORMIN (GLUCOPHAGE) 500 MG tablet TAKE ONE TABLET BY MOUTH TWICE A DAY WITH MEALS 180 tablet 1   • metOLazone (ZAROXOLYN) 5 MG tablet Take 1 tablet by mouth Daily As Needed.     • Multiple Vitamin (MULTI VITAMIN DAILY PO) Take 1 tablet by mouth daily.     • O2 (OXYGEN) Inhale 3 L Daily.     • pilocarpine (PILOCAR) 2 % ophthalmic solution Administer 1 drop to both eyes 2 (Two) Times a Day.     • Restasis 0.05 % ophthalmic emulsion      • spironolactone (ALDACTONE) 25 MG tablet Take 1 tablet by mouth Daily.     • TRELEGY ELLIPTA 100-62.5-25 MCG/INH aerosol powder  Inhale 1 puff Daily.     • albuterol (PROVENTIL HFA;VENTOLIN HFA) 108 (90 BASE) MCG/ACT inhaler Inhale 2 puffs Every 4 (Four) Hours As Needed for Wheezing. 18 g 3   • prednisoLONE acetate (PRED FORTE) 1 % ophthalmic suspension Administer 1 drop into the left eye 4 (Four) Times a Day.     • metoprolol succinate XL (TOPROL-XL) 25 MG 24 hr tablet Take 0.5 tablets by mouth Daily.       No facility-administered medications prior to visit.       No opioid medication identified on active medication list. I have reviewed chart for other potential  high risk medication/s and harmful drug interactions in the elderly.          Aspirin is not on active medication list.  Aspirin use is not indicated based on review of current medical condition/s. Risk of harm outweighs potential benefits.  .    Patient Active Problem List   Diagnosis   • Chronic obstructive pulmonary disease (HCC)   • Gastroesophageal reflux disease   • Hypertension   • Insomnia   • Osteoporosis   • Acute pharyngitis   • Tobacco dependence syndrome   • Vitamin D deficiency   • Hyperlipidemia   • Nicotine abuse   • Postviral fatigue syndrome   • Type 2 diabetes mellitus without complication, without long-term current use of insulin (HCC)   • PAF (paroxysmal  "atrial fibrillation) (HCC)   • Diverticulosis of large intestine without hemorrhage   • Acute respiratory failure with hypoxemia (HCC)   • Cardiac pacemaker in situ   • CHF (congestive heart failure) (HCC)   • Glaucoma   • Obesity (BMI 30-39.9)   • Sleep apnea   • Atrial flutter (HCC)   • Chronic anticoagulation   • Hospital discharge follow-up   • Osteopenia after menopause   • PAH (pulmonary artery hypertension) ([facility])     Advance Care Planning  Advance Directive is not on file.  ACP discussion was held with the patient during this visit. Patient does not have an advance directive, declines further assistance.          Objective    Vitals:    22 1422   BP: 110/60   Pulse: 71   Temp: 97.3 °F (36.3 °C)   Weight: 65.7 kg (144 lb 12.8 oz)   Height: 152 cm (59.84\")   PainSc: 0-No pain     Estimated body mass index is 28.43 kg/m² as calculated from the following:    Height as of this encounter: 152 cm (59.84\").    Weight as of this encounter: 65.7 kg (144 lb 12.8 oz).    BMI is >= 25 and <30. (Overweight) The following options were offered after discussion;: nutrition counseling/recommendations      Does the patient have evidence of cognitive impairment? No    Physical Exam  Lab Results   Component Value Date    CHLPL 213 (H) 2022    TRIG 102 2022    HDL 57 2022     (H) 2022    VLDL 18 2022    HGBA1C 6.40 (H) 2022            HEALTH RISK ASSESSMENT    Smoking Status:  Social History     Tobacco Use   Smoking Status Former Smoker   • Packs/day: 0.25   • Years: 30.00   • Pack years: 7.50   • Types: Cigarettes   • Quit date: 2013   • Years since quittin.7   Smokeless Tobacco Never Used   Tobacco Comment    last cigarette x6 months-9months ago.      Alcohol Consumption:  Social History     Substance and Sexual Activity   Alcohol Use Yes    Comment: RARE     Fall Risk Screen:    STEADI Fall Risk Assessment was completed, and patient is at LOW risk for falls.Assessment " completed on:3/28/2022    Depression Screening:  PHQ-2/PHQ-9 Depression Screening 9/30/2022   Retired Total Score -   Little Interest or Pleasure in Doing Things 0-->not at all   Feeling Down, Depressed or Hopeless 0-->not at all   PHQ-9: Brief Depression Severity Measure Score 0       Health Habits and Functional and Cognitive Screening:  Functional & Cognitive Status 9/30/2022   Do you have difficulty preparing food and eating? No   Do you have difficulty bathing yourself, getting dressed or grooming yourself? No   Do you have difficulty using the toilet? No   Do you have difficulty moving around from place to place? No   Do you have trouble with steps or getting out of a bed or a chair? No   Current Diet Well Balanced Diet   Dental Exam -   Eye Exam Up to date   Exercise (times per week) 4 times per week   Current Exercises Include Walking   Do you need help using the phone?  No   Are you deaf or do you have serious difficulty hearing?  No   Do you need help with transportation? No   Do you need help shopping? No   Do you need help preparing meals?  No   Do you need help with housework?  Yes   Do you need help with laundry? No   Do you need help taking your medications? No   Do you need help managing money? No   Do you ever drive or ride in a car without wearing a seat belt? No   Have you felt unusual stress, anger or loneliness in the last month? No   Who do you live with? Spouse   If you need help, do you have trouble finding someone available to you? No   Have you been bothered in the last four weeks by sexual problems? -   Do you have difficulty concentrating, remembering or making decisions? No       Age-appropriate Screening Schedule:  Refer to the list below for future screening recommendations based on patient's age, sex and/or medical conditions. Orders for these recommended tests are listed in the plan section. The patient has been provided with a written plan.    Health Maintenance   Topic Date Due   •  ZOSTER VACCINE (1 of 2) Never done   • URINE MICROALBUMIN  04/13/2022   • TDAP/TD VACCINES (2 - Td or Tdap) 01/01/2023   • HEMOGLOBIN A1C  03/26/2023   • DIABETIC FOOT EXAM  07/07/2023   • DIABETIC EYE EXAM  08/01/2023   • LIPID PANEL  09/26/2023   • PAP SMEAR  02/09/2024   • MAMMOGRAM  02/15/2024   • DXA SCAN  04/11/2024   • INFLUENZA VACCINE  Completed              Assessment & Plan   CMS Preventative Services Quick Reference  Risk Factors Identified During Encounter  Immunizations Discussed/Encouraged (specific Immunizations; Influenza  The above risks/problems have been discussed with the patient.  Follow up actions/plans if indicated are seen below in the Assessment/Plan Section.  Pertinent information has been shared with the patient in the After Visit Summary.    Diagnoses and all orders for this visit:    1. Encounter for subsequent annual wellness visit (AWV) in Medicare patient (Primary)    2. Need for influenza vaccination  -     Fluzone High-Dose 65+yrs    3. Mixed hyperlipidemia  -     Lipid Panel With / Chol / HDL Ratio; Future    4. Type 2 diabetes mellitus with stage 3a chronic kidney disease, without long-term current use of insulin (HCC)  -     Comprehensive Metabolic Panel; Future  -     Hemoglobin A1c; Future  -     MicroAlbumin, Urine, Random - Urine, Clean Catch; Future        Follow Up:   No follow-ups on file.     An After Visit Summary and PPPS were made available to the patient.

## 2022-10-28 ENCOUNTER — OFFICE (OUTPATIENT)
Dept: URBAN - METROPOLITAN AREA CLINIC 76 | Facility: CLINIC | Age: 67
End: 2022-10-28

## 2022-10-28 VITALS
OXYGEN SATURATION: 94 % | HEART RATE: 87 BPM | SYSTOLIC BLOOD PRESSURE: 91 MMHG | DIASTOLIC BLOOD PRESSURE: 55 MMHG | WEIGHT: 152.4 LBS

## 2022-10-28 DIAGNOSIS — K92.89 OTHER SPECIFIED DISEASES OF THE DIGESTIVE SYSTEM: ICD-10-CM

## 2022-10-28 DIAGNOSIS — K64.9 UNSPECIFIED HEMORRHOIDS: ICD-10-CM

## 2022-10-28 DIAGNOSIS — K21.9 GASTRO-ESOPHAGEAL REFLUX DISEASE WITHOUT ESOPHAGITIS: ICD-10-CM

## 2022-10-28 DIAGNOSIS — K59.09 OTHER CONSTIPATION: ICD-10-CM

## 2022-10-28 PROCEDURE — 99204 OFFICE O/P NEW MOD 45 MIN: CPT | Performed by: INTERNAL MEDICINE

## 2022-10-28 NOTE — SERVICEHPINOTES
I have not seen Ms. Reid in some time.  I did a colonoscopy on her in 2017, she has a history of polyps.  Since I saw her she was diagnosed with pulmonary hypertension, she is now on 3 L of oxygen continuously.  Due to her medications for pulmonary hypertension she's had issues with reflux.  She's also had some weight gain, due to her health issue she's not been is active.  That's also contributed to her reflux.  There is no dysphagia, odynophagia nausea or vomiting.  She is anemic but she has no evidence of GI blood loss.  She does use Prilosec over-the-counter.
br
br She also has worsening constipation probably related to medicines.  She's also had gas and bloating which could be due to the constipation, she probably also has some degree of aerophagia.  She's also been using fiber from time to time which probably contributes to the bloating as well.  She has to strain every time her bowels move.  She has a bowel movement 2-3 times a week.  There is no blood in the bowels.  She'll be due for colonoscopy in December.  She looks somewhat chronically ill but she is in no acute distress today.

## 2022-10-28 NOTE — SERVICENOTES
records reviewed.  Hemoglobin 10.9.  Hepatic function panel within normal limits.  A1c 6.4.  She does have a slight elevation in BUN and creatinine.

## 2022-11-14 ENCOUNTER — TELEPHONE (OUTPATIENT)
Dept: INTERNAL MEDICINE | Facility: CLINIC | Age: 67
End: 2022-11-14

## 2022-11-14 DIAGNOSIS — R25.2 MUSCLE CRAMPS: Primary | ICD-10-CM

## 2022-11-14 NOTE — TELEPHONE ENCOUNTER
What labs would you run for her? She just saw you on 9/30. Pt's next follow up is not until 1/3/2023.

## 2022-11-16 LAB
CK SERPL-CCNC: 50 U/L (ref 20–180)
MAGNESIUM SERPL-MCNC: 2.5 MG/DL (ref 1.6–2.4)

## 2022-12-14 ENCOUNTER — ON CAMPUS - OUTPATIENT (OUTPATIENT)
Dept: URBAN - METROPOLITAN AREA HOSPITAL 108 | Facility: HOSPITAL | Age: 67
End: 2022-12-14

## 2022-12-14 DIAGNOSIS — K29.70 GASTRITIS, UNSPECIFIED, WITHOUT BLEEDING: ICD-10-CM

## 2022-12-14 DIAGNOSIS — R10.13 EPIGASTRIC PAIN: ICD-10-CM

## 2022-12-14 DIAGNOSIS — Z86.010 PERSONAL HISTORY OF COLONIC POLYPS: ICD-10-CM

## 2022-12-14 DIAGNOSIS — Z12.11 ENCOUNTER FOR SCREENING FOR MALIGNANT NEOPLASM OF COLON: ICD-10-CM

## 2022-12-14 DIAGNOSIS — K64.4 RESIDUAL HEMORRHOIDAL SKIN TAGS: ICD-10-CM

## 2022-12-14 PROCEDURE — 45378 DIAGNOSTIC COLONOSCOPY: CPT | Mod: PT | Performed by: INTERNAL MEDICINE

## 2022-12-14 PROCEDURE — 43239 EGD BIOPSY SINGLE/MULTIPLE: CPT | Performed by: INTERNAL MEDICINE

## 2023-01-03 ENCOUNTER — OFFICE VISIT (OUTPATIENT)
Dept: INTERNAL MEDICINE | Facility: CLINIC | Age: 68
End: 2023-01-03
Payer: MEDICARE

## 2023-01-03 VITALS
TEMPERATURE: 96.8 F | WEIGHT: 144 LBS | DIASTOLIC BLOOD PRESSURE: 56 MMHG | HEART RATE: 91 BPM | OXYGEN SATURATION: 94 % | BODY MASS INDEX: 28.27 KG/M2 | SYSTOLIC BLOOD PRESSURE: 98 MMHG

## 2023-01-03 DIAGNOSIS — E11.9 TYPE 2 DIABETES MELLITUS WITHOUT COMPLICATION, WITHOUT LONG-TERM CURRENT USE OF INSULIN: Primary | ICD-10-CM

## 2023-01-03 DIAGNOSIS — N18.32 STAGE 3B CHRONIC KIDNEY DISEASE: ICD-10-CM

## 2023-01-03 PROCEDURE — 1159F MED LIST DOCD IN RCRD: CPT | Performed by: NURSE PRACTITIONER

## 2023-01-03 PROCEDURE — 99214 OFFICE O/P EST MOD 30 MIN: CPT | Performed by: NURSE PRACTITIONER

## 2023-01-03 PROCEDURE — 1160F RVW MEDS BY RX/DR IN RCRD: CPT | Performed by: NURSE PRACTITIONER

## 2023-01-03 RX ORDER — DILTIAZEM HYDROCHLORIDE 300 MG/1
CAPSULE, COATED, EXTENDED RELEASE ORAL
COMMUNITY
Start: 2023-01-02

## 2023-01-03 RX ORDER — CYCLOSPORINE 0.5 MG/ML
1 EMULSION OPHTHALMIC
COMMUNITY

## 2023-01-03 NOTE — PROGRESS NOTES
Subjective   Laine Thomas is a 67 y.o. female. Patient is here today for   Chief Complaint   Patient presents with   • Follow-up     Patient is here for a 3 month follow up with labs done prior    • Diabetes   .    History of Present Illness   Here to follow up on diabetes which is controlled on current medications. Denies any problems with low glucose readings.     Patient has had some dry patches on eyes - seeing ophthalmology. She has had her diuretics adjusted to help with edema    The following portions of the patient's history were reviewed and updated as appropriate: allergies, current medications, past family history, past medical history, past social history, past surgical history and problem list.    Review of Systems    Objective     Vitals:    01/03/23 1134   BP: 98/56   Pulse: 91   Temp: 96.8 °F (36 °C)   SpO2: 94%     Body mass index is 28.27 kg/m².    Results Encounter on 12/30/2022   Component Date Value Ref Range Status   • Glucose 12/28/2022 162 (H)  65 - 99 mg/dL Final   • BUN 12/28/2022 39 (H)  8 - 23 mg/dL Final   • Creatinine 12/28/2022 1.65 (H)  0.57 - 1.00 mg/dL Final   • EGFR Result 12/28/2022 33.7 (L)  >60.0 mL/min/1.73 Final    Comment: National Kidney Foundation and American Society of  Nephrology (ASN) Task Force recommended calculation based  on the Chronic Kidney Disease Epidemiology Collaboration  (CKD-EPI) equation refit without adjustment for race.  GFR Normal >60  Chronic Kidney Disease <60  Kidney Failure <15     • BUN/Creatinine Ratio 12/28/2022 23.6  7.0 - 25.0 Final   • Sodium 12/28/2022 136  136 - 145 mmol/L Final   • Potassium 12/28/2022 4.1  3.5 - 5.2 mmol/L Final   • Chloride 12/28/2022 92 (L)  98 - 107 mmol/L Final   • Total CO2 12/28/2022 29.3 (H)  22.0 - 29.0 mmol/L Final   • Calcium 12/28/2022 10.1  8.6 - 10.5 mg/dL Final   • Total Protein 12/28/2022 7.3  6.0 - 8.5 g/dL Final   • Albumin 12/28/2022 4.8  3.5 - 5.2 g/dL Final   • Globulin 12/28/2022 2.5  gm/dL Final    • A/G Ratio 12/28/2022 1.9  g/dL Final   • Total Bilirubin 12/28/2022 0.2  0.0 - 1.2 mg/dL Final   • Alkaline Phosphatase 12/28/2022 102  39 - 117 U/L Final   • AST (SGOT) 12/28/2022 18  1 - 32 U/L Final   • ALT (SGPT) 12/28/2022 18  1 - 33 U/L Final   • Hemoglobin A1C 12/28/2022 7.20 (H)  4.80 - 5.60 % Final    Comment: Hemoglobin A1C Ranges:  Increased Risk for Diabetes  5.7% to 6.4%  Diabetes                     >= 6.5%  Diabetic Goal                < 7.0%     • Total Cholesterol 12/28/2022 173  0 - 200 mg/dL Final    Comment: Cholesterol Reference Ranges  (U.S. Department of Health and Human Services ATP III  Classifications)  Desirable          <200 mg/dL  Borderline High    200-239 mg/dL  High Risk          >240 mg/dL  Triglyceride Reference Ranges  (U.S. Department of Health and Human Services ATP III  Classifications)  Normal           <150 mg/dL  Borderline High  150-199 mg/dL  High             200-499 mg/dL  Very High        >500 mg/dL  HDL Reference Ranges  (U.S. Department of Health and Human Services ATP III  Classifications)  Low     <40 mg/dl (major risk factor for CHD)  High    >60 mg/dl ('negative' risk factor for CHD)  LDL Reference Ranges  (U.S. Department of Health and Human Services ATP III  Classifications)  Optimal          <100 mg/dL  Near Optimal     100-129 mg/dL  Borderline High  130-159 mg/dL  High             160-189 mg/dL  Very High        >189 mg/dL     • Triglycerides 12/28/2022 103  0 - 150 mg/dL Final   • HDL Cholesterol 12/28/2022 56  40 - 60 mg/dL Final   • VLDL Cholesterol Honorio 12/28/2022 19  5 - 40 mg/dL Final   • LDL Chol Calc ([facility]) 12/28/2022 98  0 - 100 mg/dL Final   • Chol/HDL Ratio 12/28/2022 3.09   Final   • Microalbumin, Urine 12/28/2022 6.5  Not Estab. ug/mL Final     Reviewed labs with patient.     Physical Exam  Vitals and nursing note reviewed.   Constitutional:       Appearance: Normal appearance. She is well-developed.   Cardiovascular:      Rate and Rhythm: Normal  rate and regular rhythm.      Heart sounds: Normal heart sounds.   Pulmonary:      Effort: Pulmonary effort is normal.      Breath sounds: Examination of the right-lower field reveals wheezing. Wheezing present.   Musculoskeletal:      Right lower leg: Edema present.      Left lower le+ Pitting Edema present.   Skin:     General: Skin is warm and dry.   Neurological:      Mental Status: She is alert.   Psychiatric:         Speech: Speech normal.         Behavior: Behavior normal.         Thought Content: Thought content normal.         Assessment & Plan   Diagnoses and all orders for this visit:    1. Type 2 diabetes mellitus without complication, without long-term current use of insulin (HCC) (Primary)  -     linagliptin (TRADJENTA) 5 MG tablet tablet; Take 1 tablet by mouth Daily.  Dispense: 30 tablet; Refill: 1  -     Comprehensive Metabolic Panel; Future  -     Hemoglobin A1c; Future    2. Stage 3b chronic kidney disease (HCC)  -     Ambulatory Referral to Nephrology  -     Comprehensive Metabolic Panel; Future      DM -discontinue metformin due to renal function.  We will start patient on Tradjenta 5 mg daily.  Recheck labs in 3 months    Stage 3 kidney disease -patient was previously referred to nephrology, but states that she never went because she is seeing other specialists that have been adjusting her diuretics then other medications for her heart.  I do think that at this point that she really needs to see a nephrologist.  Referral placed           Current Outpatient Medications:   •  Adempas 2.5 MG tablet, Take 2.5 mg by mouth 2 (Two) Times a Day., Disp: , Rfl:   •  albuterol (PROVENTIL HFA;VENTOLIN HFA) 108 (90 BASE) MCG/ACT inhaler, Inhale 2 puffs Every 4 (Four) Hours As Needed for Wheezing., Disp: 18 g, Rfl: 3  •  brimonidine (ALPHAGAN) 0.15 % ophthalmic solution, Administer 1 drop to both eyes 2 (Two) Times a Day., Disp: , Rfl:   •  bumetanide (BUMEX) 1 MG tablet, Take 2 mg by mouth Daily., Disp:  , Rfl:   •  Cholecalciferol (VITAMIN D3) 125 MCG (5000 UT) capsule capsule, Take 5,000 Units by mouth Daily., Disp: , Rfl:   •  ciclopirox (LOPROX) 0.77 % cream, Apply 1 application topically to the appropriate area as directed 3 (Three) Times a Day., Disp: , Rfl:   •  dilTIAZem (TIAZAC) 300 MG 24 hr capsule, Take 300 mg by mouth Daily., Disp: , Rfl:   •  dorzolamide (TRUSOPT) 2 % ophthalmic solution, Apply 1 drop to eye(s) as directed by provider 2 (two) times a day., Disp: , Rfl:   •  ELIQUIS 5 MG tablet tablet, Take 1 tablet by mouth 2 (Two) Times a Day., Disp: , Rfl:   •  glucose monitor monitoring kit, 1 each Daily. Use to check glucose daily, Disp: 1 each, Rfl: 0  •  ibuprofen (ADVIL,MOTRIN) 200 MG tablet, Take 200 mg by mouth Every 6 (Six) Hours As Needed for Mild Pain ., Disp: , Rfl:   •  Macitentan (Opsumit) 10 MG tablet, Take 1 tablet by mouth Daily., Disp: , Rfl:   •  metOLazone (ZAROXOLYN) 5 MG tablet, Take 1 tablet by mouth Daily As Needed., Disp: , Rfl:   •  Multiple Vitamin (MULTI VITAMIN DAILY PO), Take 1 tablet by mouth daily., Disp: , Rfl:   •  O2 (OXYGEN), Inhale 3 L Daily., Disp: , Rfl:   •  pilocarpine (PILOCAR) 2 % ophthalmic solution, Administer 1 drop to both eyes 2 (Two) Times a Day., Disp: , Rfl:   •  Restasis 0.05 % ophthalmic emulsion, , Disp: , Rfl:   •  spironolactone (ALDACTONE) 25 MG tablet, Take 1 tablet by mouth Daily., Disp: , Rfl:   •  TRELEGY ELLIPTA 100-62.5-25 MCG/INH aerosol powder , Inhale 1 puff Daily., Disp: , Rfl:   •  cycloSPORINE (RESTASIS) 0.05 % ophthalmic emulsion, Apply 1 drop to eye(s) as directed by provider., Disp: , Rfl:   •  dilTIAZem CD (CARDIZEM CD) 300 MG 24 hr capsule, , Disp: , Rfl:   •  linagliptin (TRADJENTA) 5 MG tablet tablet, Take 1 tablet by mouth Daily., Disp: 30 tablet, Rfl: 1  •  prednisoLONE acetate (PRED FORTE) 1 % ophthalmic suspension, Administer 1 drop into the left eye 4 (Four) Times a Day., Disp: , Rfl:

## 2023-01-06 ENCOUNTER — TELEPHONE (OUTPATIENT)
Dept: INTERNAL MEDICINE | Facility: CLINIC | Age: 68
End: 2023-01-06

## 2023-01-06 NOTE — TELEPHONE ENCOUNTER
Caller: Laine Thomas    Relationship: Self    Best call back number:[phone]    What is the medical concern/diagnosis: KIDNEY FUNCTION    What specialty or service is being requested: NEPHROLOGY     What is the provider, practice or medical service name: DR ANDREI RIBERA    What is the office location: [address] [place] [address]    What is the office phone number: [phone]    Any additional details:

## 2023-02-20 ENCOUNTER — OFFICE (OUTPATIENT)
Dept: URBAN - METROPOLITAN AREA CLINIC 76 | Facility: CLINIC | Age: 68
End: 2023-02-20

## 2023-02-20 VITALS
WEIGHT: 163 LBS | HEART RATE: 74 BPM | OXYGEN SATURATION: 92 % | SYSTOLIC BLOOD PRESSURE: 100 MMHG | DIASTOLIC BLOOD PRESSURE: 60 MMHG

## 2023-02-20 DIAGNOSIS — K59.09 OTHER CONSTIPATION: ICD-10-CM

## 2023-02-20 DIAGNOSIS — K92.89 OTHER SPECIFIED DISEASES OF THE DIGESTIVE SYSTEM: ICD-10-CM

## 2023-02-20 DIAGNOSIS — K64.9 UNSPECIFIED HEMORRHOIDS: ICD-10-CM

## 2023-02-20 DIAGNOSIS — K21.9 GASTRO-ESOPHAGEAL REFLUX DISEASE WITHOUT ESOPHAGITIS: ICD-10-CM

## 2023-02-20 PROCEDURE — 99214 OFFICE O/P EST MOD 30 MIN: CPT | Performed by: NURSE PRACTITIONER

## 2023-03-05 DIAGNOSIS — E11.9 TYPE 2 DIABETES MELLITUS WITHOUT COMPLICATION, WITHOUT LONG-TERM CURRENT USE OF INSULIN: ICD-10-CM

## 2023-03-06 RX ORDER — LINAGLIPTIN 5 MG/1
TABLET, FILM COATED ORAL
Qty: 30 TABLET | Refills: 1 | Status: SHIPPED | OUTPATIENT
Start: 2023-03-06 | End: 2023-03-31 | Stop reason: SDUPTHER

## 2023-03-31 DIAGNOSIS — E11.9 TYPE 2 DIABETES MELLITUS WITHOUT COMPLICATION, WITHOUT LONG-TERM CURRENT USE OF INSULIN: ICD-10-CM

## 2023-09-19 ENCOUNTER — OFFICE (OUTPATIENT)
Dept: URBAN - METROPOLITAN AREA CLINIC 76 | Facility: CLINIC | Age: 68
End: 2023-09-19

## 2023-09-19 VITALS — WEIGHT: 163 LBS

## 2023-09-19 DIAGNOSIS — Z86.010 PERSONAL HISTORY OF COLONIC POLYPS: ICD-10-CM

## 2023-09-19 DIAGNOSIS — K21.9 GASTRO-ESOPHAGEAL REFLUX DISEASE WITHOUT ESOPHAGITIS: ICD-10-CM

## 2023-09-19 DIAGNOSIS — K57.30 DIVERTICULOSIS OF LARGE INTESTINE WITHOUT PERFORATION OR ABS: ICD-10-CM

## 2023-09-19 DIAGNOSIS — K59.09 OTHER CONSTIPATION: ICD-10-CM

## 2023-09-19 DIAGNOSIS — K64.9 UNSPECIFIED HEMORRHOIDS: ICD-10-CM

## 2023-09-19 DIAGNOSIS — K92.89 OTHER SPECIFIED DISEASES OF THE DIGESTIVE SYSTEM: ICD-10-CM

## 2023-09-19 PROBLEM — Z12.11 SURVEILLANCE DUE TO PRIOR COLONIC NEOPLASIA: Status: ACTIVE | Noted: 2017-12-06

## 2023-09-19 PROCEDURE — 99213 OFFICE O/P EST LOW 20 MIN: CPT | Performed by: INTERNAL MEDICINE

## 2024-03-19 ENCOUNTER — OFFICE (OUTPATIENT)
Dept: URBAN - METROPOLITAN AREA CLINIC 76 | Facility: CLINIC | Age: 69
End: 2024-03-19
Payer: COMMERCIAL

## 2024-03-19 VITALS
SYSTOLIC BLOOD PRESSURE: 100 MMHG | DIASTOLIC BLOOD PRESSURE: 42 MMHG | SYSTOLIC BLOOD PRESSURE: 75 MMHG | OXYGEN SATURATION: 94 % | DIASTOLIC BLOOD PRESSURE: 61 MMHG | WEIGHT: 152 LBS | HEART RATE: 148 BPM | SYSTOLIC BLOOD PRESSURE: 69 MMHG | DIASTOLIC BLOOD PRESSURE: 50 MMHG

## 2024-03-19 DIAGNOSIS — K21.9 GASTRO-ESOPHAGEAL REFLUX DISEASE WITHOUT ESOPHAGITIS: ICD-10-CM

## 2024-03-19 DIAGNOSIS — K59.00 CONSTIPATION, UNSPECIFIED: ICD-10-CM

## 2024-03-19 PROCEDURE — 99213 OFFICE O/P EST LOW 20 MIN: CPT

## 2024-07-25 ENCOUNTER — HOSPITAL ENCOUNTER (EMERGENCY)
Facility: HOSPITAL | Age: 69
Discharge: HOME | End: 2024-07-25
Attending: EMERGENCY MEDICINE
Payer: MEDICARE

## 2024-07-25 VITALS
TEMPERATURE: 98.3 F | BODY MASS INDEX: 29.45 KG/M2 | DIASTOLIC BLOOD PRESSURE: 59 MMHG | HEIGHT: 60 IN | WEIGHT: 150 LBS | HEART RATE: 63 BPM | OXYGEN SATURATION: 92 % | SYSTOLIC BLOOD PRESSURE: 125 MMHG | RESPIRATION RATE: 20 BRPM

## 2024-07-25 DIAGNOSIS — S91.012A LACERATION OF LEFT ANKLE, INITIAL ENCOUNTER: Primary | ICD-10-CM

## 2024-07-25 PROCEDURE — 25010000002 TETANUS-DIPHTH-ACELL PERTUSSIS 5-2.5-18.5 LF-MCG/0.5 SUSPENSION PREFILLED SYRINGE: Performed by: EMERGENCY MEDICINE

## 2024-07-25 PROCEDURE — 90715 TDAP VACCINE 7 YRS/> IM: CPT | Performed by: EMERGENCY MEDICINE

## 2024-07-25 PROCEDURE — 25010000002 LIDOCAINE 1 % SOLUTION: Performed by: EMERGENCY MEDICINE

## 2024-07-25 PROCEDURE — 99283 EMERGENCY DEPT VISIT LOW MDM: CPT

## 2024-07-25 PROCEDURE — 90471 IMMUNIZATION ADMIN: CPT | Performed by: EMERGENCY MEDICINE

## 2024-07-25 RX ORDER — LIDOCAINE HYDROCHLORIDE 10 MG/ML
10 INJECTION, SOLUTION INFILTRATION; PERINEURAL ONCE
Status: COMPLETED | OUTPATIENT
Start: 2024-07-25 | End: 2024-07-25

## 2024-07-25 RX ORDER — CEPHALEXIN 500 MG/1
500 CAPSULE ORAL ONCE
Status: COMPLETED | OUTPATIENT
Start: 2024-07-25 | End: 2024-07-25

## 2024-07-25 RX ORDER — CEPHALEXIN 500 MG/1
500 CAPSULE ORAL 3 TIMES DAILY
Qty: 21 CAPSULE | Refills: 0 | Status: SHIPPED | OUTPATIENT
Start: 2024-07-25 | End: 2024-08-01

## 2024-07-25 RX ADMIN — LIDOCAINE HYDROCHLORIDE 10 ML: 10 INJECTION, SOLUTION INFILTRATION; PERINEURAL at 22:40

## 2024-07-25 RX ADMIN — CEPHALEXIN 500 MG: 500 CAPSULE ORAL at 22:36

## 2024-07-25 RX ADMIN — TETANUS TOXOID, REDUCED DIPHTHERIA TOXOID AND ACELLULAR PERTUSSIS VACCINE, ADSORBED 0.5 ML: 5; 2.5; 8; 8; 2.5 SUSPENSION INTRAMUSCULAR at 21:31

## 2024-07-25 NOTE — ED NOTES
Did not hit head isolated L LE injury.  Noted edema to bilat LE pt states normal currently working with PCP to adjust her diuretics

## 2024-07-26 NOTE — ED PROVIDER NOTES
Subjective   History of Present Illness  68 yo female was about to be seated at a restaurant w family this evening when she scraped her left lateral lower extremity above the ankle on the chair, sustaining laceration. States tetanus not UTD. Denies any falls, or hitting her head, or LOC. States she felt off balance attempting to maneuver the chair. Dressing was applied prior to her arrival w bleeding controlled, pt states her tetanus is not UTD and was ordered. She denies any bony or muscular pain of the ankle and was able to bear weight.         Review of Systems   All other systems reviewed and are negative.      Past Medical History:   Diagnosis Date    Atrial fibrillation     CHF (congestive heart failure)     COPD (chronic obstructive pulmonary disease)     Diabetes mellitus     Eczema     Emphysema of lung     emp    GERD (gastroesophageal reflux disease)     Glaucoma     Hyperlipidemia     Hypertension     Nicotine abuse     Pulmonary hypertension     Sleep apnea        Allergies   Allergen Reactions    Statins Myalgia     Cant tolerate        Past Surgical History:   Procedure Laterality Date    CARDIAC ABLATION  2017     SECTION          CHOLECYSTECTOMY      lap in     EYE SURGERY      four times as a child for lazy eye    NASAL SEPTUM SURGERY      repair in 's    PACEMAKER IMPLANTATION      TUBAL ABDOMINAL LIGATION             Family History   Problem Relation Age of Onset    COPD Mother     Emphysema Mother     Dementia Mother     Hypertension Father     Heart defect Father        Social History     Socioeconomic History    Marital status:      Spouse name: Edmond Thomas    Number of children: 2   Tobacco Use    Smoking status: Former     Current packs/day: 0.00     Average packs/day: 0.3 packs/day for 30.0 years (7.5 ttl pk-yrs)     Types: Cigarettes     Start date: 1983     Quit date: 2013     Years since quitting: 10.5    Smokeless tobacco: Never    Tobacco  comments:     last cigarette x6 months-9months ago.    Substance and Sexual Activity    Alcohol use: Yes     Comment: RARE    Drug use: No           Objective   Physical Exam  Vitals and nursing note reviewed.   Constitutional:       General: She is not in acute distress.     Appearance: Normal appearance. She is not toxic-appearing.   HENT:      Head: Normocephalic.      Nose: Nose normal.      Mouth/Throat:      Mouth: Mucous membranes are moist.   Eyes:      Pupils: Pupils are equal, round, and reactive to light.   Cardiovascular:      Rate and Rhythm: Normal rate and regular rhythm.      Pulses: Normal pulses.      Heart sounds: Normal heart sounds.   Pulmonary:      Effort: Pulmonary effort is normal. No respiratory distress.      Breath sounds: Normal breath sounds. No wheezing or rales.   Abdominal:      General: There is no distension.      Palpations: Abdomen is soft.      Tenderness: There is no abdominal tenderness.   Musculoskeletal:         General: No swelling, tenderness or deformity. Normal range of motion.      Cervical back: Normal range of motion.        Legs:       Comments: V shaped laceration, subcutaneous, approx 5cm left lower extremity. No foreign body or active bleeding.    Skin:     General: Skin is warm and dry.   Neurological:      General: No focal deficit present.      Mental Status: She is alert and oriented to person, place, and time. Mental status is at baseline.   Psychiatric:         Mood and Affect: Mood normal.         Laceration Repair    Date/Time: 7/26/2024 10:58 PM    Performed by: Zafar Shrsetha MD  Authorized by: Zafar Shrestha MD    Consent:     Consent obtained:  Verbal    Consent given by:  Patient    Risks discussed:  Infection and pain    Alternatives discussed:  No treatment  Universal protocol:     Procedure explained and questions answered to patient or proxy's satisfaction: yes      Patient identity confirmed:  Verbally with patient  Anesthesia:      Anesthesia method:  Local infiltration    Local anesthetic:  Lidocaine 1% w/o epi  Laceration details:     Location:  Leg    Leg location:  L lower leg    Length (cm):  5    Depth (mm):  0.5  Pre-procedure details:     Preparation:  Patient was prepped and draped in usual sterile fashion  Exploration:     Hemostasis achieved with:  Direct pressure    Wound exploration: wound explored through full range of motion      Wound extent: no foreign bodies/material noted, no muscle damage noted, no nerve damage noted, no tendon damage noted, no underlying fracture noted and no vascular damage noted      Contaminated: no    Treatment:     Area cleansed with:  Shur-Clens and soap and water    Amount of cleaning:  Standard    Irrigation solution:  Sterile saline    Irrigation method:  Pressure wash    Visualized foreign bodies/material removed: no      Debridement:  None    Undermining:  None    Scar revision: no    Skin repair:     Repair method:  Sutures    Suture size:  4-0    Suture material:  Prolene    Suture technique:  Simple interrupted    Number of sutures:  7  Approximation:     Approximation:  Close  Repair type:     Repair type:  Simple  Post-procedure details:     Dressing:  Antibiotic ointment and non-adherent dressing    Procedure completion:  Tolerated             ED Course                                             Medical Decision Making  68 yo female w left lower extrem laceration.   -tetanus given  -abx prophylaxis  -repaired as described above  -understands plan of care and follow up.     Problems Addressed:  Laceration of left ankle, initial encounter: complicated acute illness or injury    Risk  Prescription drug management.        Final diagnoses:   Laceration of left ankle, initial encounter       ED Disposition  ED Disposition       ED Disposition   Discharge    Condition   Stable    Comment   --               Daya Bowser, APRN  [address] [facility]  [address]  [address]  [phone]    In 10 days  For suture removal         Medication List        New Prescriptions      cephalexin 500 MG capsule  Commonly known as: KEFLEX  Take 1 capsule by mouth 3 (Three) Times a Day for 7 days.               Where to Get Your Medications        These medications were sent to [facility] [id number] - [facility] [address] [facility]  AT [address] - [phone]  - [phone]   [address] [facility] , [facility] [address]      Phone: [phone]   cephalexin 500 MG capsule            Zafar Shrestha MD  07/26/24 0103

## 2025-02-18 ENCOUNTER — OFFICE (OUTPATIENT)
Dept: URBAN - METROPOLITAN AREA CLINIC 76 | Facility: CLINIC | Age: 70
End: 2025-02-18

## 2025-02-18 VITALS
HEART RATE: 83 BPM | WEIGHT: 130 LBS | SYSTOLIC BLOOD PRESSURE: 118 MMHG | HEIGHT: 60 IN | DIASTOLIC BLOOD PRESSURE: 76 MMHG | OXYGEN SATURATION: 94 %

## 2025-02-18 DIAGNOSIS — R11.2 NAUSEA WITH VOMITING, UNSPECIFIED: ICD-10-CM

## 2025-02-18 DIAGNOSIS — K21.9 GASTRO-ESOPHAGEAL REFLUX DISEASE WITHOUT ESOPHAGITIS: ICD-10-CM

## 2025-02-18 DIAGNOSIS — K59.00 CONSTIPATION, UNSPECIFIED: ICD-10-CM

## 2025-02-18 PROCEDURE — 99214 OFFICE O/P EST MOD 30 MIN: CPT

## 2025-02-18 RX ORDER — ONDANSETRON 4 MG/1
12 TABLET, ORALLY DISINTEGRATING ORAL
Qty: 30 | Refills: 0 | Status: ACTIVE
Start: 2025-02-18

## 2025-02-18 RX ORDER — FAMOTIDINE 40 MG/1
40 TABLET, FILM COATED ORAL
Qty: 30 | Refills: 3 | Status: ACTIVE
Start: 2025-02-18

## 2025-05-20 ENCOUNTER — OFFICE (OUTPATIENT)
Dept: URBAN - METROPOLITAN AREA CLINIC 76 | Facility: CLINIC | Age: 70
End: 2025-05-20
Payer: MEDICARE

## 2025-05-20 VITALS
WEIGHT: 133 LBS | OXYGEN SATURATION: 97 % | SYSTOLIC BLOOD PRESSURE: 112 MMHG | HEART RATE: 70 BPM | HEIGHT: 60 IN | DIASTOLIC BLOOD PRESSURE: 68 MMHG

## 2025-05-20 DIAGNOSIS — K21.9 GASTRO-ESOPHAGEAL REFLUX DISEASE WITHOUT ESOPHAGITIS: ICD-10-CM

## 2025-05-20 DIAGNOSIS — K59.00 CONSTIPATION, UNSPECIFIED: ICD-10-CM

## 2025-05-20 DIAGNOSIS — R11.2 NAUSEA WITH VOMITING, UNSPECIFIED: ICD-10-CM

## 2025-05-20 PROCEDURE — 99213 OFFICE O/P EST LOW 20 MIN: CPT
